# Patient Record
Sex: MALE | Race: WHITE | NOT HISPANIC OR LATINO | Employment: OTHER | URBAN - METROPOLITAN AREA
[De-identification: names, ages, dates, MRNs, and addresses within clinical notes are randomized per-mention and may not be internally consistent; named-entity substitution may affect disease eponyms.]

---

## 2018-03-02 ENCOUNTER — OFFICE VISIT (OUTPATIENT)
Dept: URGENT CARE | Facility: CLINIC | Age: 83
End: 2018-03-02
Payer: MEDICARE

## 2018-03-02 VITALS
TEMPERATURE: 97.8 F | HEART RATE: 58 BPM | WEIGHT: 223 LBS | HEIGHT: 72 IN | OXYGEN SATURATION: 98 % | DIASTOLIC BLOOD PRESSURE: 76 MMHG | BODY MASS INDEX: 30.2 KG/M2 | SYSTOLIC BLOOD PRESSURE: 124 MMHG

## 2018-03-02 DIAGNOSIS — R21 RASH: ICD-10-CM

## 2018-03-02 DIAGNOSIS — H61.21 IMPACTED CERUMEN OF RIGHT EAR: ICD-10-CM

## 2018-03-02 PROCEDURE — 99203 OFFICE O/P NEW LOW 30 MIN: CPT | Performed by: PHYSICIAN ASSISTANT

## 2018-03-02 RX ORDER — DESONIDE 0.5 MG/G
OINTMENT TOPICAL
Qty: 1 TUBE | Refills: 0 | Status: SHIPPED | OUTPATIENT
Start: 2018-03-02 | End: 2019-05-29

## 2018-03-02 RX ORDER — DESONIDE 0.5 MG/G
OINTMENT TOPICAL 2 TIMES DAILY
COMMUNITY
End: 2018-03-02 | Stop reason: SDUPTHER

## 2018-03-02 RX ORDER — CHLORAL HYDRATE 500 MG
1000 CAPSULE ORAL
COMMUNITY

## 2018-03-02 ASSESSMENT — ENCOUNTER SYMPTOMS: PALPITATIONS: 0

## 2018-03-02 NOTE — PROGRESS NOTES
Subjective:      Justo Webber is a 82 y.o. male who presents with Cerumen Impaction (Earwax removal)            Cerumen Impaction   This is a new problem. The current episode started in the past 7 days. The problem occurs constantly. The problem has been gradually improving. Associated symptoms include a rash. Pertinent negatives include no chest pain. Nothing aggravates the symptoms. Treatments tried: ear wax remover. The treatment provided moderate relief.       Review of Systems   HENT: Positive for hearing loss.    Cardiovascular: Negative for chest pain and palpitations.   Skin: Positive for rash.   All other systems reviewed and are negative.    PMH:  has no past medical history on file.  MEDS:   Current Outpatient Prescriptions:   •  Multiple Vitamin (MULTI-DAY VITAMINS PO), Take  by mouth., Disp: , Rfl:   •  aspirin (ASPIRIN LOW DOSE) 81 MG tablet, Take 81 mg by mouth every day., Disp: , Rfl:   •  Omega-3 Fatty Acids (FISH OIL) 1000 MG Cap capsule, Take 1,000 mg by mouth 3 times a day, with meals., Disp: , Rfl:   •  desonide (DESOWEN) 0.05 % ointment, Apply  to affected area(s) 2 times a day., Disp: 1 Tube, Rfl: 0  ALLERGIES:   Allergies   Allergen Reactions   • Other Food Hives and Itching     Artificial Sweetner     SURGHX: History reviewed. No pertinent surgical history.  SOCHX:    FH: Family history was reviewed, no pertinent findings to report  Medications, Allergies, and current problem list reviewed today in Epic       Objective:     /76   Pulse (!) 58   Temp 36.6 °C (97.8 °F)   Ht 1.829 m (6')   Wt 101.2 kg (223 lb)   SpO2 98%   BMI 30.24 kg/m²      Physical Exam   Constitutional: He appears well-developed and well-nourished.   HENT:   Cerumen in right ear canal   Cardiovascular: Normal rate, regular rhythm and normal heart sounds.    Pulmonary/Chest: Effort normal and breath sounds normal.   Skin: Skin is warm and dry. Rash noted. There is erythema.        Psychiatric: He has a  normal mood and affect. His behavior is normal. Judgment and thought content normal.   Vitals reviewed.              Assessment/Plan:     1. Rash  - Rash consistent with tinea cruris  - desonide (DESOWEN) 0.05 % ointment; Apply  to affected area(s) 2 times a day.  Dispense: 1 Tube; Refill: 0    2. Impacted cerumen of right ear  - CONT debrox as needed    Differential diagnosis, natural history, supportive care discussed. Follow-up with primary care provider within 7-10 days, emergency room precautions discussed.  Patient and/or family appears understanding of information.

## 2019-05-29 ENCOUNTER — OFFICE VISIT (OUTPATIENT)
Dept: URGENT CARE | Facility: CLINIC | Age: 84
End: 2019-05-29
Payer: MEDICARE

## 2019-05-29 VITALS
TEMPERATURE: 99.4 F | HEIGHT: 72 IN | OXYGEN SATURATION: 90 % | RESPIRATION RATE: 12 BRPM | DIASTOLIC BLOOD PRESSURE: 82 MMHG | HEART RATE: 66 BPM | WEIGHT: 235.6 LBS | BODY MASS INDEX: 31.91 KG/M2 | SYSTOLIC BLOOD PRESSURE: 158 MMHG

## 2019-05-29 DIAGNOSIS — Z76.0 MEDICATION REFILL: ICD-10-CM

## 2019-05-29 DIAGNOSIS — R21 RASH: ICD-10-CM

## 2019-05-29 PROCEDURE — 99214 OFFICE O/P EST MOD 30 MIN: CPT | Performed by: NURSE PRACTITIONER

## 2019-05-29 RX ORDER — DESONIDE 0.5 MG/G
OINTMENT TOPICAL
Qty: 1 TUBE | Refills: 0 | Status: SHIPPED | OUTPATIENT
Start: 2019-05-29 | End: 2022-07-11

## 2019-05-29 RX ORDER — DESONIDE 0.5 MG/G
OINTMENT TOPICAL
Qty: 1 TUBE | Refills: 0 | Status: SHIPPED | OUTPATIENT
Start: 2019-05-29 | End: 2019-05-29

## 2019-05-29 ASSESSMENT — ENCOUNTER SYMPTOMS
COUGH: 0
SHORTNESS OF BREATH: 0
CHILLS: 0
SORE THROAT: 0
TINGLING: 0
NAUSEA: 0
FEVER: 0

## 2019-05-29 ASSESSMENT — LIFESTYLE VARIABLES: SUBSTANCE_ABUSE: 0

## 2019-05-29 NOTE — PROGRESS NOTES
Subjective:      Justo Webber is a 83 y.o. male who presents with Other (REFILL ON OINTMENT )    Reviewed past medical, surgical and family history with patient. Reviewed prescription and OTC medications with patient in electronic health record today.     Allergies   Allergen Reactions   • Other Food Hives and Itching     Artificial Sweetner             HPI  This is a new problem.  Justo is a 83-year-old male patient requesting refill on medication for groin itch.  He has been on medication on and off for several years.  He applies it once every 3 to 4 days which he will alleviates his itch and provide some comfort.  He has no visible rash.  He tries to keep himself clean and dry.  Wears cotton underclothing.  No other aggravating or alleviating factors.  Requests a written prescription so he consented to his mail pharmacy.      Review of Systems   Constitutional: Negative for chills, fever and malaise/fatigue.   HENT: Negative for sore throat.    Respiratory: Negative for cough and shortness of breath.    Gastrointestinal: Negative for nausea.   Skin: Positive for itching (groin). Negative for rash.   Neurological: Negative for tingling.   Psychiatric/Behavioral: Negative for substance abuse.          Objective:     /82 (BP Location: Left arm, Patient Position: Sitting, BP Cuff Size: Adult)   Pulse 66   Temp 37.4 °C (99.4 °F) (Temporal)   Resp 12   Ht 1.829 m (6')   Wt 106.9 kg (235 lb 9.6 oz)   SpO2 90%   BMI 31.95 kg/m²      Physical Exam   Constitutional: He is oriented to person, place, and time. He appears well-developed and well-nourished.   HENT:   Head: Normocephalic.   Cardiovascular: Normal rate.    Pulmonary/Chest: Effort normal.   Neurological: He is alert and oriented to person, place, and time.   Skin: Skin is warm. Capillary refill takes less than 2 seconds.        Psychiatric: He has a normal mood and affect. His speech is normal and behavior is normal. Thought content normal.    Nursing note and vitals reviewed.              Assessment/Plan:     1. Rash  desonide (DESOWEN) 0.05 % ointment    DISCONTINUED: desonide (DESOWEN) 0.05 % ointment   2. Medication refill         Educated in proper administration of medication(s) ordered today including safety, possible SE, risks, benefits, rationale and alternatives to therapy.     FU prn .  Recommend that he follow-up with primary care provider regarding prolonged itchiness in his groin.  Patient verbalizes understanding.    Keep skin clean and dry.  Wash daily with mild soap and water.

## 2021-01-14 DIAGNOSIS — Z23 NEED FOR VACCINATION: ICD-10-CM

## 2022-07-06 ENCOUNTER — HOSPITAL ENCOUNTER (EMERGENCY)
Facility: MEDICAL CENTER | Age: 87
End: 2022-07-06
Attending: EMERGENCY MEDICINE
Payer: MEDICARE

## 2022-07-06 ENCOUNTER — APPOINTMENT (OUTPATIENT)
Dept: RADIOLOGY | Facility: MEDICAL CENTER | Age: 87
End: 2022-07-06
Attending: EMERGENCY MEDICINE
Payer: MEDICARE

## 2022-07-06 VITALS
HEART RATE: 59 BPM | BODY MASS INDEX: 28.85 KG/M2 | RESPIRATION RATE: 16 BRPM | HEIGHT: 72 IN | OXYGEN SATURATION: 97 % | SYSTOLIC BLOOD PRESSURE: 150 MMHG | TEMPERATURE: 97.3 F | DIASTOLIC BLOOD PRESSURE: 69 MMHG | WEIGHT: 212.96 LBS

## 2022-07-06 DIAGNOSIS — R42 VERTIGO: ICD-10-CM

## 2022-07-06 LAB
ALBUMIN SERPL BCP-MCNC: 4.4 G/DL (ref 3.2–4.9)
ALBUMIN/GLOB SERPL: 1.4 G/DL
ALP SERPL-CCNC: 60 U/L (ref 30–99)
ALT SERPL-CCNC: 11 U/L (ref 2–50)
ANION GAP SERPL CALC-SCNC: 13 MMOL/L (ref 7–16)
AST SERPL-CCNC: 15 U/L (ref 12–45)
BASOPHILS # BLD AUTO: 1 % (ref 0–1.8)
BASOPHILS # BLD: 0.07 K/UL (ref 0–0.12)
BILIRUB SERPL-MCNC: 0.7 MG/DL (ref 0.1–1.5)
BUN SERPL-MCNC: 19 MG/DL (ref 8–22)
CALCIUM SERPL-MCNC: 9.3 MG/DL (ref 8.5–10.5)
CHLORIDE SERPL-SCNC: 109 MMOL/L (ref 96–112)
CO2 SERPL-SCNC: 21 MMOL/L (ref 20–33)
CREAT SERPL-MCNC: 1.26 MG/DL (ref 0.5–1.4)
EKG IMPRESSION: NORMAL
EOSINOPHIL # BLD AUTO: 0.07 K/UL (ref 0–0.51)
EOSINOPHIL NFR BLD: 1 % (ref 0–6.9)
ERYTHROCYTE [DISTWIDTH] IN BLOOD BY AUTOMATED COUNT: 48.5 FL (ref 35.9–50)
GFR SERPLBLD CREATININE-BSD FMLA CKD-EPI: 56 ML/MIN/1.73 M 2
GLOBULIN SER CALC-MCNC: 3.1 G/DL (ref 1.9–3.5)
GLUCOSE SERPL-MCNC: 133 MG/DL (ref 65–99)
HCT VFR BLD AUTO: 41.5 % (ref 42–52)
HGB BLD-MCNC: 14 G/DL (ref 14–18)
IMM GRANULOCYTES # BLD AUTO: 0.02 K/UL (ref 0–0.11)
IMM GRANULOCYTES NFR BLD AUTO: 0.3 % (ref 0–0.9)
LYMPHOCYTES # BLD AUTO: 0.93 K/UL (ref 1–4.8)
LYMPHOCYTES NFR BLD: 13.7 % (ref 22–41)
MCH RBC QN AUTO: 32 PG (ref 27–33)
MCHC RBC AUTO-ENTMCNC: 33.7 G/DL (ref 33.7–35.3)
MCV RBC AUTO: 94.7 FL (ref 81.4–97.8)
MONOCYTES # BLD AUTO: 0.31 K/UL (ref 0–0.85)
MONOCYTES NFR BLD AUTO: 4.6 % (ref 0–13.4)
NEUTROPHILS # BLD AUTO: 5.37 K/UL (ref 1.82–7.42)
NEUTROPHILS NFR BLD: 79.4 % (ref 44–72)
NRBC # BLD AUTO: 0 K/UL
NRBC BLD-RTO: 0 /100 WBC
PLATELET # BLD AUTO: 188 K/UL (ref 164–446)
PMV BLD AUTO: 11.1 FL (ref 9–12.9)
POTASSIUM SERPL-SCNC: 5 MMOL/L (ref 3.6–5.5)
PROT SERPL-MCNC: 7.5 G/DL (ref 6–8.2)
RBC # BLD AUTO: 4.38 M/UL (ref 4.7–6.1)
SODIUM SERPL-SCNC: 143 MMOL/L (ref 135–145)
TROPONIN T SERPL-MCNC: 12 NG/L (ref 6–19)
WBC # BLD AUTO: 6.8 K/UL (ref 4.8–10.8)

## 2022-07-06 PROCEDURE — 36415 COLL VENOUS BLD VENIPUNCTURE: CPT

## 2022-07-06 PROCEDURE — 93005 ELECTROCARDIOGRAM TRACING: CPT

## 2022-07-06 PROCEDURE — 84484 ASSAY OF TROPONIN QUANT: CPT

## 2022-07-06 PROCEDURE — 70496 CT ANGIOGRAPHY HEAD: CPT | Mod: MG

## 2022-07-06 PROCEDURE — 700117 HCHG RX CONTRAST REV CODE 255: Performed by: EMERGENCY MEDICINE

## 2022-07-06 PROCEDURE — 99284 EMERGENCY DEPT VISIT MOD MDM: CPT

## 2022-07-06 PROCEDURE — 70498 CT ANGIOGRAPHY NECK: CPT | Mod: MG

## 2022-07-06 PROCEDURE — 700105 HCHG RX REV CODE 258: Performed by: EMERGENCY MEDICINE

## 2022-07-06 PROCEDURE — 700102 HCHG RX REV CODE 250 W/ 637 OVERRIDE(OP): Performed by: EMERGENCY MEDICINE

## 2022-07-06 PROCEDURE — 85025 COMPLETE CBC W/AUTO DIFF WBC: CPT

## 2022-07-06 PROCEDURE — 71045 X-RAY EXAM CHEST 1 VIEW: CPT

## 2022-07-06 PROCEDURE — 93005 ELECTROCARDIOGRAM TRACING: CPT | Performed by: EMERGENCY MEDICINE

## 2022-07-06 PROCEDURE — A9270 NON-COVERED ITEM OR SERVICE: HCPCS | Performed by: EMERGENCY MEDICINE

## 2022-07-06 PROCEDURE — 80053 COMPREHEN METABOLIC PANEL: CPT

## 2022-07-06 RX ORDER — MECLIZINE HYDROCHLORIDE 25 MG/1
25 TABLET ORAL ONCE
Status: COMPLETED | OUTPATIENT
Start: 2022-07-06 | End: 2022-07-06

## 2022-07-06 RX ORDER — SODIUM CHLORIDE 9 MG/ML
500 INJECTION, SOLUTION INTRAVENOUS ONCE
Status: COMPLETED | OUTPATIENT
Start: 2022-07-06 | End: 2022-07-06

## 2022-07-06 RX ORDER — MECLIZINE HYDROCHLORIDE 25 MG/1
25 TABLET ORAL 3 TIMES DAILY PRN
Qty: 30 TABLET | Refills: 0 | Status: SHIPPED | OUTPATIENT
Start: 2022-07-06 | End: 2022-07-11

## 2022-07-06 RX ADMIN — IOHEXOL 75 ML: 350 INJECTION, SOLUTION INTRAVENOUS at 15:23

## 2022-07-06 RX ADMIN — SODIUM CHLORIDE 500 ML: 9 INJECTION, SOLUTION INTRAVENOUS at 14:45

## 2022-07-06 RX ADMIN — MECLIZINE HYDROCHLORIDE 25 MG: 25 TABLET ORAL at 14:45

## 2022-07-06 NOTE — ED PROVIDER NOTES
ED Provider Note    CHIEF COMPLAINT  Chief Complaint   Patient presents with   • Dizziness     Patient reports sudden onset of dizziness this morning at approx 0815   • Abdominal Pain     Patient reports sudden onset abdominal pain this morning. Pain has now resolved   • Chest Pressure     Patient reports chest pressure this morning which has now resolved.       HPI  Justo Webber is a 86 y.o. male who presents for evaluation of acute dizziness with difficulty walking.  The patient woke up this morning in his normal state of health, states he slept in 30 to 60 minutes longer than usual.  Shortly after waking up while standing at the counter he had acute onset of dizziness described as a disequilibrium, he was having trouble walking because of this.  At the same time he had a very mild pressure in his chest and a feeling of nausea.  He states that very mild chest pressure has resolved and the nausea has resolved, he states that his dizziness has improved.  He has never had an episode like this in the past.  He describes an episode 6 months ago where he became lightheaded but different than this episode.  No shortness of breath, no coughing, no fever.  No focal weakness numbness or tingling.  No headache.  No neck pain.  No other acute complaints offered at this time.  Patient describes himself as being in general good health, he rides his bicycle 6 to 7 miles every other day.    REVIEW OF SYSTEMS  Negative for fever, rash, dyspnea, abdominal pain, vomiting, diarrhea, headache, focal weakness, focal numbness, focal tingling, back pain. All other systems are negative.     PAST MEDICAL HISTORY  Hypertension    FAMILY HISTORY  History reviewed. No pertinent family history.    SOCIAL HISTORY  Social History     Tobacco Use   • Smoking status: Never Smoker   • Smokeless tobacco: Never Used   Substance Use Topics   • Alcohol use: Yes   • Drug use: Never       SURGICAL HISTORY  History reviewed. No pertinent surgical  history.    CURRENT MEDICATIONS  I personally reviewed the medication list in the charting documentation.     ALLERGIES  Allergies   Allergen Reactions   • Other Food Hives and Itching     Artificial Sweetner       MEDICAL RECORD  I have reviewed patient's medical record and pertinent results are listed above.      PHYSICAL EXAM  VITAL SIGNS: BP (!) 151/73   Pulse (!) 51   Temp 36.6 °C (97.8 °F) (Temporal)   Resp 16   Ht 1.829 m (6')   Wt 96.6 kg (212 lb 15.4 oz)   SpO2 97%   BMI 28.88 kg/m²    Constitutional: Elderly but well appearing patient in no acute distress.  Awake and alert, not toxic nor ill in appearance.  HENT: Normocephalic, no obvious evidence of acute trauma.  Eyes: No scleral icterus. Normal conjunctiva   Neck: Comfortable movement without any obvious restriction in the range of motion.  Cardiovascular: Upon ascultation I appreciate a regular heart rhythm and a minimally bradycardic rate.   Thorax & Lungs: Normal nonlabored respirations.  Upon application of the stethoscope for auscultation I find there to be no associated chest wall tenderness.  I appreciate no wheezing, rhonchi or rales. There is normal air movement.    Abdomen: The abdomen is not visibly distended. Upon palpation, I find it to be without tenderness.  No mass appreciated.  Skin: The exposed portions of skin reveal no obvious rash or other abnormalities.  Extremities/Musculoskeletal: No obvious sign of acute trauma. No asymmetric calf tenderness or edema.   Neurologic: Awake, alert, oriented.  Cranial nerve examination reveals a right-sided facial droop (wife reports chronic from prior Bell's palsy) normal and symmetric upper and lower extremity motor and sensory function bilaterally.  Normal finger-to-nose.  Becomes unsteady with standing, cannot complete gait testing because of unsteadiness.  Psychiatric: Normal affect appropriate for the clinical situation.    DIAGNOSTIC STUDIES / PROCEDURES    LABS/EKGs  Results for  orders placed or performed during the hospital encounter of 07/06/22   CBC with Differential   Result Value Ref Range    WBC 6.8 4.8 - 10.8 K/uL    RBC 4.38 (L) 4.70 - 6.10 M/uL    Hemoglobin 14.0 14.0 - 18.0 g/dL    Hematocrit 41.5 (L) 42.0 - 52.0 %    MCV 94.7 81.4 - 97.8 fL    MCH 32.0 27.0 - 33.0 pg    MCHC 33.7 33.7 - 35.3 g/dL    RDW 48.5 35.9 - 50.0 fL    Platelet Count 188 164 - 446 K/uL    MPV 11.1 9.0 - 12.9 fL    Neutrophils-Polys 79.40 (H) 44.00 - 72.00 %    Lymphocytes 13.70 (L) 22.00 - 41.00 %    Monocytes 4.60 0.00 - 13.40 %    Eosinophils 1.00 0.00 - 6.90 %    Basophils 1.00 0.00 - 1.80 %    Immature Granulocytes 0.30 0.00 - 0.90 %    Nucleated RBC 0.00 /100 WBC    Neutrophils (Absolute) 5.37 1.82 - 7.42 K/uL    Lymphs (Absolute) 0.93 (L) 1.00 - 4.80 K/uL    Monos (Absolute) 0.31 0.00 - 0.85 K/uL    Eos (Absolute) 0.07 0.00 - 0.51 K/uL    Baso (Absolute) 0.07 0.00 - 0.12 K/uL    Immature Granulocytes (abs) 0.02 0.00 - 0.11 K/uL    NRBC (Absolute) 0.00 K/uL   Complete Metabolic Panel (CMP)   Result Value Ref Range    Sodium 143 135 - 145 mmol/L    Potassium 5.0 3.6 - 5.5 mmol/L    Chloride 109 96 - 112 mmol/L    Co2 21 20 - 33 mmol/L    Anion Gap 13.0 7.0 - 16.0    Glucose 133 (H) 65 - 99 mg/dL    Bun 19 8 - 22 mg/dL    Creatinine 1.26 0.50 - 1.40 mg/dL    Calcium 9.3 8.5 - 10.5 mg/dL    AST(SGOT) 15 12 - 45 U/L    ALT(SGPT) 11 2 - 50 U/L    Alkaline Phosphatase 60 30 - 99 U/L    Total Bilirubin 0.7 0.1 - 1.5 mg/dL    Albumin 4.4 3.2 - 4.9 g/dL    Total Protein 7.5 6.0 - 8.2 g/dL    Globulin 3.1 1.9 - 3.5 g/dL    A-G Ratio 1.4 g/dL   Troponin   Result Value Ref Range    Troponin T 12 6 - 19 ng/L   ESTIMATED GFR   Result Value Ref Range    GFR (CKD-EPI) 56 (A) >60 mL/min/1.73 m 2   EKG   Result Value Ref Range    Report       [facility] Emergency Dept[facility]    Test Date:  2022-07-06  Pt Name:    MARCI SEYMOUR                   Department: ER  MRN:        [id number]                       Room:  Gender:     Male                         Technician: [id number]  :        [id number]                   Requested By:ER TRIAGE PROTOCOL  Order #:    [id number]                    Reading MD: KIM REYNOSO MD    Measurements  Intervals                                Axis  Rate:       52                           P:  MD:                                      QRS:        -17  QRSD:       112                          T:          38  QT:         464  QTc:        432    Interpretive Statements  12 Lead EKG interpreted by me to show: -- Rate 52 -- Rhythm: Normal sinus  rhythm  -- Axis: Normal -- MD and QRS Intervals: Normal -- T waves: No acute changes  --  ST segments: No acute changes -- Ectopy: None. My impression of this EKG:  Does  not indicate acute ischemia at this time.  Electronically Signed On 2022 13:12 :12 PDT by KIM REYNOSO MD          RADIOLOGY  CT-CTA HEAD WITH & W/O-POST PROCESS   Final Result      CT angiogram of the [place] within normal limits for age.      CT-CTA NECK WITH & W/O-POST PROCESSING   Final Result      1.  Moderate 50-70% proximal right internal carotid artery stenosis.   2.  Significant proximal left ICA atherosclerosis with less than 50% stenosis.   3.  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      DX-CHEST-PORTABLE (1 VIEW)   Final Result      No acute cardiopulmonary abnormality.            COURSE & MEDICAL DECISION MAKING  I have reviewed any medical record information, laboratory studies and radiographic results as noted above.  Differential diagnoses includes: BPPV, central vertigo, ICH, dehydration, electro abnormalities, anemia, ACS    Encounter Summary: This is a very pleasant 86 y.o. male who unfortunately required evaluation in the emergency department today with dizziness described as disequilibrium, associated difficulty walking that is reproduced here in the emergency department.  No other focal neurologic complaints or findings  on exam.  Cute onset this morning.  No history of.  Describes a chest pressure that was mild and resolved now.  Also had some nausea that also resolved.  He has an EKG here which reveals a slightly bradycardic rhythm but no evidence of ischemia.  Will obtain CT and CTA of his head and neck, blood work including a troponin.  Will administer IV fluids and meclizine and he will be reevaluated ------ blood work is actually quite reassuring, has a mild decrease in his GFR, normal creatinine and BUN.  Troponin is normal.  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  After a single dose of the Antivert the patient is feeling improved, he is now walking with a steady gait independently.  At this point, I have gone over very strict return instructions with the patient and his daughter and they expressed understanding.  He will be prescribed Antivert.  Discharged home in stable condition      DISPOSITION: Discharged home in stable condition      FINAL IMPRESSION  1. Vertigo           This dictation was created using voice recognition software. The accuracy of the dictation is limited to the abilities of the software. I expect there may be some errors of grammar and possibly content. The nursing notes were reviewed and certain aspects of this information were incorporated into this note.    Electronically signed by: Fritz Vargas M.D., 7/6/2022 1:34 PM

## 2022-07-06 NOTE — ED TRIAGE NOTES
Chief Complaint   Patient presents with   • Dizziness     Patient reports sudden onset of dizziness this morning at approx 0815   • Abdominal Pain     Patient reports sudden onset abdominal pain this morning. Pain has now resolved   • Chest Pressure     Patient reports chest pressure this morning which has now resolved.       87 yo male to triage for above complaint.Patient reports all symptoms have resolved.    EKG complete. Protocol ordered.     Pt is alert and oriented, speaking in full sentences, follows commands and responds appropriately to questions.     Patient placed back in lobby and educated on triage process. Asked to inform RN of any changes.    BP (!) 151/73   Pulse (!) 51   Temp 36.6 °C (97.8 °F) (Temporal)   Resp 16   Ht 1.829 m (6')   Wt 96.6 kg (212 lb 15.4 oz)   SpO2 97%   BMI 28.88 kg/m²

## 2022-07-08 ENCOUNTER — OFFICE VISIT (OUTPATIENT)
Dept: MEDICAL GROUP | Facility: CLINIC | Age: 87
End: 2022-07-08
Payer: MEDICARE

## 2022-07-08 VITALS
RESPIRATION RATE: 16 BRPM | TEMPERATURE: 97 F | WEIGHT: 220 LBS | SYSTOLIC BLOOD PRESSURE: 133 MMHG | BODY MASS INDEX: 29.84 KG/M2 | OXYGEN SATURATION: 95 % | HEART RATE: 73 BPM | DIASTOLIC BLOOD PRESSURE: 74 MMHG

## 2022-07-08 DIAGNOSIS — R55 NEAR SYNCOPE: ICD-10-CM

## 2022-07-08 PROCEDURE — 99213 OFFICE O/P EST LOW 20 MIN: CPT | Mod: GC

## 2022-07-08 ASSESSMENT — FIBROSIS 4 INDEX: FIB4 SCORE: 2.04

## 2022-07-08 ASSESSMENT — PATIENT HEALTH QUESTIONNAIRE - PHQ9: CLINICAL INTERPRETATION OF PHQ2 SCORE: 0

## 2022-07-08 NOTE — PATIENT INSTRUCTIONS
Please schedule an appointment with cardiology for further evaluation of your near syncope. Please eat food every few hours to avoid low sugar levels. And please follow up with us in a month or return for worsening symptoms.     If cardiology does not follow up please call our referral department at [phone]

## 2022-07-08 NOTE — PROGRESS NOTES
"Subjective:     CC: ER follow up     HPI:   Justo presents today after being evaluated the ER two days ago. Pt reports 2 days ago he woke up and was doing his morning routine when he began to fell \"off\". He describes feeling off balance and not feeling \"steady on his feet\". Denies loss of consciousness, a room spinning sensation, or light headedness. He states he then ate breakfast but his symptoms did not improve so he went to lay down. After he laid down, he describes he started feeling \"light chest pressure\" and nausea that lasted for several hours. He was concerned that he was having a heart attack and went to the ER who discharged him that same day after negative findings for heart attack and normal labs and imaging.   Pt states he is feeling much improved and his symptoms have completely resolved at this time. He reports a similar episode of \"feeling off\" several months ago after he went on a bike ride in the morning without eating. He did have breakfast afterwards and felt much improved. Has not had an episode of chest pressure in the past.     I reviewed his ER notes, EKG indicated Rate 52 Rhythm: Normal sinus  Rhythm. Axis: Normal. OH and QRS Intervals: Normal T waves: No acute changes. ST segments: No acute changes. Ectopy: None. CT angiogram indicated moderate artery stenosis of the right internal carotid at 50-70% and proximal left ICA atherosclerosis at 50%. Chest X-ray no acute finding. CMP and troponin were within normal limits.    Current Outpatient Medications Ordered in Epic   Medication Sig Dispense Refill   • meclizine (ANTIVERT) 25 MG Tab Take 1 Tablet by mouth 3 times a day as needed for Dizziness. 30 Tablet 0   • lisinopril (PRINIVIL) 20 MG Tab TAKE 1 TABLET EVERY DAY 90 Tablet 3   • desonide (DESOWEN) 0.05 % ointment Apply to rash BID prn itch 1 Tube 0   • Multiple Vitamin (MULTI-DAY VITAMINS PO) Take  by mouth.     • aspirin (ASPIRIN LOW DOSE) 81 MG tablet Take 81 mg by mouth every day.     • " Omega-3 Fatty Acids (FISH OIL) 1000 MG Cap capsule Take 1,000 mg by mouth 3 times a day, with meals.       No current Epic-ordered facility-administered medications on file.     ROS:  Gen: no fevers/chills, no changes in weight  Pulm: no sob, no cough  CV: no chest pain, no palpitations  GI: no nausea/vomiting, no diarrhea    Objective:     Exam:  /74   Pulse 73   Temp 36.1 °C (97 °F) (Temporal)   Resp 16   Wt 99.8 kg (220 lb)   SpO2 95%   BMI 29.84 kg/m²  Body mass index is 29.84 kg/m².    Gen: Alert and oriented, No apparent distress.  Neck: Neck is supple without lymphadenopathy.  Lungs: Normal effort, CTA bilaterally, no wheezes, rhonchi, or rales  CV: 3/6 systolic murmur left sternal border, Regular rate and rhythm. No thrill, rubs, or gallops.  Ext: No clubbing, cyanosis, edema.    Labs: no new labs performed today    Assessment & Plan:     86 y.o. male with the following -     1. Near syncope  Patient's syncope could be due to cardiac abnormalities, less likely hypoglycemia, orthostatic hypotension   -Recent labs were normal   -His symptoms did not improve with eating   -Pt does have a 3/6 systolic murmur at baseline   -Symptom occurred with chest pressure for several hours.   -I ordered a referral for cardiology for further evaluation.     Return in about 1 month (around 8/8/2022) for follow up regarding your near syncope and chest discomfort as well as after cardiology appointment .    Bernice Larson M.D.  PGY1

## 2022-07-11 ENCOUNTER — APPOINTMENT (OUTPATIENT)
Dept: RADIOLOGY | Facility: MEDICAL CENTER | Age: 87
End: 2022-07-11
Attending: STUDENT IN AN ORGANIZED HEALTH CARE EDUCATION/TRAINING PROGRAM
Payer: MEDICARE

## 2022-07-11 ENCOUNTER — HOSPITAL ENCOUNTER (OUTPATIENT)
Facility: MEDICAL CENTER | Age: 87
LOS: 1 days | End: 2022-07-12
Attending: EMERGENCY MEDICINE | Admitting: FAMILY MEDICINE
Payer: MEDICARE

## 2022-07-11 DIAGNOSIS — R42 DYSEQUILIBRIUM: ICD-10-CM

## 2022-07-11 DIAGNOSIS — R42 VERTIGO: ICD-10-CM

## 2022-07-11 DIAGNOSIS — R55 NEAR SYNCOPE: ICD-10-CM

## 2022-07-11 DIAGNOSIS — I45.10 RIGHT BUNDLE BRANCH BLOCK (RBBB): ICD-10-CM

## 2022-07-11 PROBLEM — R20.2 PARESTHESIA OF BILATERAL LEGS: Status: ACTIVE | Noted: 2022-07-11

## 2022-07-11 LAB
ALBUMIN SERPL BCP-MCNC: 4.4 G/DL (ref 3.2–4.9)
ALBUMIN/GLOB SERPL: 1.2 G/DL
ALP SERPL-CCNC: 64 U/L (ref 30–99)
ALT SERPL-CCNC: 11 U/L (ref 2–50)
ANION GAP SERPL CALC-SCNC: 11 MMOL/L (ref 7–16)
AST SERPL-CCNC: 16 U/L (ref 12–45)
BASOPHILS # BLD AUTO: 1.4 % (ref 0–1.8)
BASOPHILS # BLD: 0.1 K/UL (ref 0–0.12)
BILIRUB SERPL-MCNC: 0.6 MG/DL (ref 0.1–1.5)
BUN SERPL-MCNC: 18 MG/DL (ref 8–22)
CALCIUM SERPL-MCNC: 9.6 MG/DL (ref 8.5–10.5)
CHLORIDE SERPL-SCNC: 101 MMOL/L (ref 96–112)
CO2 SERPL-SCNC: 25 MMOL/L (ref 20–33)
CREAT SERPL-MCNC: 1.38 MG/DL (ref 0.5–1.4)
EKG IMPRESSION: NORMAL
EOSINOPHIL # BLD AUTO: 0.26 K/UL (ref 0–0.51)
EOSINOPHIL NFR BLD: 3.7 % (ref 0–6.9)
ERYTHROCYTE [DISTWIDTH] IN BLOOD BY AUTOMATED COUNT: 47.8 FL (ref 35.9–50)
GFR SERPLBLD CREATININE-BSD FMLA CKD-EPI: 50 ML/MIN/1.73 M 2
GLOBULIN SER CALC-MCNC: 3.6 G/DL (ref 1.9–3.5)
GLUCOSE SERPL-MCNC: 128 MG/DL (ref 65–99)
HCT VFR BLD AUTO: 43.6 % (ref 42–52)
HGB BLD-MCNC: 14.9 G/DL (ref 14–18)
IMM GRANULOCYTES # BLD AUTO: 0.01 K/UL (ref 0–0.11)
IMM GRANULOCYTES NFR BLD AUTO: 0.1 % (ref 0–0.9)
LYMPHOCYTES # BLD AUTO: 2.1 K/UL (ref 1–4.8)
LYMPHOCYTES NFR BLD: 29.6 % (ref 22–41)
MCH RBC QN AUTO: 31.9 PG (ref 27–33)
MCHC RBC AUTO-ENTMCNC: 34.2 G/DL (ref 33.7–35.3)
MCV RBC AUTO: 93.4 FL (ref 81.4–97.8)
MONOCYTES # BLD AUTO: 0.39 K/UL (ref 0–0.85)
MONOCYTES NFR BLD AUTO: 5.5 % (ref 0–13.4)
NEUTROPHILS # BLD AUTO: 4.24 K/UL (ref 1.82–7.42)
NEUTROPHILS NFR BLD: 59.7 % (ref 44–72)
NRBC # BLD AUTO: 0 K/UL
NRBC BLD-RTO: 0 /100 WBC
PLATELET # BLD AUTO: 214 K/UL (ref 164–446)
PMV BLD AUTO: 10.9 FL (ref 9–12.9)
POTASSIUM SERPL-SCNC: 4.1 MMOL/L (ref 3.6–5.5)
PROT SERPL-MCNC: 8 G/DL (ref 6–8.2)
RBC # BLD AUTO: 4.67 M/UL (ref 4.7–6.1)
SODIUM SERPL-SCNC: 137 MMOL/L (ref 135–145)
TROPONIN T SERPL-MCNC: 16 NG/L (ref 6–19)
WBC # BLD AUTO: 7.1 K/UL (ref 4.8–10.8)

## 2022-07-11 PROCEDURE — 96372 THER/PROPH/DIAG INJ SC/IM: CPT

## 2022-07-11 PROCEDURE — 70551 MRI BRAIN STEM W/O DYE: CPT | Mod: MG

## 2022-07-11 PROCEDURE — 700102 HCHG RX REV CODE 250 W/ 637 OVERRIDE(OP): Performed by: EMERGENCY MEDICINE

## 2022-07-11 PROCEDURE — 700111 HCHG RX REV CODE 636 W/ 250 OVERRIDE (IP): Performed by: STUDENT IN AN ORGANIZED HEALTH CARE EDUCATION/TRAINING PROGRAM

## 2022-07-11 PROCEDURE — 84484 ASSAY OF TROPONIN QUANT: CPT

## 2022-07-11 PROCEDURE — 99285 EMERGENCY DEPT VISIT HI MDM: CPT

## 2022-07-11 PROCEDURE — 93005 ELECTROCARDIOGRAM TRACING: CPT | Performed by: EMERGENCY MEDICINE

## 2022-07-11 PROCEDURE — 80053 COMPREHEN METABOLIC PANEL: CPT

## 2022-07-11 PROCEDURE — A9270 NON-COVERED ITEM OR SERVICE: HCPCS | Performed by: EMERGENCY MEDICINE

## 2022-07-11 PROCEDURE — 85025 COMPLETE CBC W/AUTO DIFF WBC: CPT

## 2022-07-11 PROCEDURE — 700102 HCHG RX REV CODE 250 W/ 637 OVERRIDE(OP): Performed by: STUDENT IN AN ORGANIZED HEALTH CARE EDUCATION/TRAINING PROGRAM

## 2022-07-11 PROCEDURE — 93005 ELECTROCARDIOGRAM TRACING: CPT

## 2022-07-11 PROCEDURE — 36415 COLL VENOUS BLD VENIPUNCTURE: CPT

## 2022-07-11 PROCEDURE — A9270 NON-COVERED ITEM OR SERVICE: HCPCS | Performed by: STUDENT IN AN ORGANIZED HEALTH CARE EDUCATION/TRAINING PROGRAM

## 2022-07-11 PROCEDURE — G0378 HOSPITAL OBSERVATION PER HR: HCPCS

## 2022-07-11 PROCEDURE — 99218 PR INITIAL OBSERVATION CARE,LEVL I: CPT | Mod: GC | Performed by: FAMILY MEDICINE

## 2022-07-11 RX ORDER — AMOXICILLIN 250 MG
2 CAPSULE ORAL 2 TIMES DAILY
Status: DISCONTINUED | OUTPATIENT
Start: 2022-07-11 | End: 2022-07-12 | Stop reason: HOSPADM

## 2022-07-11 RX ORDER — ENOXAPARIN SODIUM 100 MG/ML
40 INJECTION SUBCUTANEOUS DAILY
Status: DISCONTINUED | OUTPATIENT
Start: 2022-07-11 | End: 2022-07-12 | Stop reason: HOSPADM

## 2022-07-11 RX ORDER — ONDANSETRON 4 MG/1
4 TABLET, ORALLY DISINTEGRATING ORAL EVERY 4 HOURS PRN
Status: DISCONTINUED | OUTPATIENT
Start: 2022-07-11 | End: 2022-07-12 | Stop reason: HOSPADM

## 2022-07-11 RX ORDER — ACETAMINOPHEN 325 MG/1
650 TABLET ORAL EVERY 6 HOURS PRN
Status: DISCONTINUED | OUTPATIENT
Start: 2022-07-11 | End: 2022-07-12 | Stop reason: HOSPADM

## 2022-07-11 RX ORDER — POLYETHYLENE GLYCOL 3350 17 G/17G
1 POWDER, FOR SOLUTION ORAL
Status: DISCONTINUED | OUTPATIENT
Start: 2022-07-11 | End: 2022-07-12 | Stop reason: HOSPADM

## 2022-07-11 RX ORDER — HYDRALAZINE HYDROCHLORIDE 20 MG/ML
10 INJECTION INTRAMUSCULAR; INTRAVENOUS EVERY 4 HOURS PRN
Status: DISCONTINUED | OUTPATIENT
Start: 2022-07-11 | End: 2022-07-12 | Stop reason: HOSPADM

## 2022-07-11 RX ORDER — LISINOPRIL 20 MG/1
20 TABLET ORAL DAILY
Status: DISCONTINUED | OUTPATIENT
Start: 2022-07-11 | End: 2022-07-12 | Stop reason: HOSPADM

## 2022-07-11 RX ORDER — OMEPRAZOLE 20 MG/1
20 CAPSULE, DELAYED RELEASE ORAL DAILY
Status: DISCONTINUED | OUTPATIENT
Start: 2022-07-11 | End: 2022-07-12 | Stop reason: HOSPADM

## 2022-07-11 RX ORDER — MECLIZINE HYDROCHLORIDE 25 MG/1
25 TABLET ORAL ONCE
Status: COMPLETED | OUTPATIENT
Start: 2022-07-11 | End: 2022-07-11

## 2022-07-11 RX ORDER — BISACODYL 10 MG
10 SUPPOSITORY, RECTAL RECTAL
Status: DISCONTINUED | OUTPATIENT
Start: 2022-07-11 | End: 2022-07-12 | Stop reason: HOSPADM

## 2022-07-11 RX ORDER — ONDANSETRON 2 MG/ML
4 INJECTION INTRAMUSCULAR; INTRAVENOUS EVERY 4 HOURS PRN
Status: DISCONTINUED | OUTPATIENT
Start: 2022-07-11 | End: 2022-07-12 | Stop reason: HOSPADM

## 2022-07-11 RX ADMIN — ENOXAPARIN SODIUM 40 MG: 40 INJECTION SUBCUTANEOUS at 17:42

## 2022-07-11 RX ADMIN — MECLIZINE HYDROCHLORIDE 25 MG: 25 TABLET ORAL at 14:21

## 2022-07-11 ASSESSMENT — COGNITIVE AND FUNCTIONAL STATUS - GENERAL
DRESSING REGULAR LOWER BODY CLOTHING: A LITTLE
CLIMB 3 TO 5 STEPS WITH RAILING: A LITTLE
SUGGESTED CMS G CODE MODIFIER MOBILITY: CJ
DAILY ACTIVITIY SCORE: 22
WALKING IN HOSPITAL ROOM: A LITTLE
MOVING FROM LYING ON BACK TO SITTING ON SIDE OF FLAT BED: A LITTLE
HELP NEEDED FOR BATHING: A LITTLE
SUGGESTED CMS G CODE MODIFIER DAILY ACTIVITY: CJ
MOBILITY SCORE: 20
STANDING UP FROM CHAIR USING ARMS: A LITTLE

## 2022-07-11 ASSESSMENT — PAIN DESCRIPTION - PAIN TYPE: TYPE: ACUTE PAIN;CHRONIC PAIN

## 2022-07-11 ASSESSMENT — FIBROSIS 4 INDEX: FIB4 SCORE: 2.04

## 2022-07-11 NOTE — ED NOTES
Pt to room via wheelchair.  Pt able to ambulate to bed from  with standby assist.  Pt is alert and oriented with a GCS of 15.  Pt states he is not dizzy and does not have double vision but rather has a problem with his equilibrium.  Pt resting comfortably.  Placed on monitor.

## 2022-07-11 NOTE — ED NOTES
Ok to give patient food per ERP.  Patient given half of a turkey sandwich and a mac and cheese.  Patient super cute and thankful   Yes

## 2022-07-11 NOTE — H&P
[facility] MEDICINE HISTORY AND PHYSICAL     PATIENT ID:  NAME:  Justo Webber  MRN:               [id number]  YOB: 1935    Date of Admission:   7/11/2022     Attending:   Dr. Galdamez     Resident:   Haider Metcalf D.O.    Primary Care Physician:    Dr. Willams     CC:    Dizziness       HPI:   Justo Webber is a 86 y.o. male with past medical history of hypertension presented for recurrence of dizziness and bilateral lower extremity paresthesias.  Patient was seen in the emergency department 7/6/2022 for same complaint and was also experiencing chest pain at that time.  He had CTA head and neck imaging that showed right ICA stenosis.  He was discharged home with meclizine, which she says did not provide significant relief of symptoms.  However symptoms did resolve and reoccurred today, which brought him back to the emergency department.  He states that he has not experienced fevers, chills, chest pain, shortness of breath, abdominal pain, nausea, emesis, hematochezia, focal numbness or weakness.  Denies back pain, bowel or bladder incontinence.  He has experienced bilateral lower extremity numbness with associated weakness, which has made ambulation difficult.    ERCourse:  In emergency department, patient bradycardic otherwise he medically stable.  C CBC and CMP essentially unremarkable.  Troponin normal.  ECG negative for ST elevations, or heart block.    REVIEW OF SYSTEMS:   See HPI.    PAST MEDICAL HISTORY:  History reviewed. No pertinent past medical history.    PAST SURGICAL HISTORY:  History reviewed. No pertinent surgical history.    FAMILY HISTORY:  No family history on file.    SOCIAL HISTORY:   Lives in home with wife.  Denies tobacco or illicit drug use.  Infrequent alcohol use.    DIET:   Orders Placed This Encounter   Procedures   • Diet Order Diet: Cardiac     Standing Status:   Standing     Number of Occurrences:   1     Order Specific Question:   Diet:     Answer:   Cardiac [6]        ALLERGIES:  Allergies   Allergen Reactions   • Other Food Hives and Itching     Artificial Sweetner       OUTPATIENT MEDICATIONS:    Current Facility-Administered Medications:   •  senna-docusate (PERICOLACE or SENOKOT S) 8.6-50 MG per tablet 2 Tablet, 2 Tablet, Oral, BID **AND** polyethylene glycol/lytes (MIRALAX) PACKET 1 Packet, 1 Packet, Oral, QDAY PRN **AND** magnesium hydroxide (MILK OF MAGNESIA) suspension 30 mL, 30 mL, Oral, QDAY PRN **AND** bisacodyl (DULCOLAX) suppository 10 mg, 10 mg, Rectal, QDAY PRN, CHRISTEN SotoOJosue  •  enoxaparin (Lovenox) inj 40 mg, 40 mg, Subcutaneous, DAILY AT 1800, NOEMÍ Soto.OJosue  •  acetaminophen (Tylenol) tablet 650 mg, 650 mg, Oral, Q6HRS PRN, CHRISTEN SotoO.  •  hydrALAZINE (APRESOLINE) injection 10 mg, 10 mg, Intravenous, Q4HRS PRN, CHRISTEN SotoO.  •  ondansetron (ZOFRAN) syringe/vial injection 4 mg, 4 mg, Intravenous, Q4HRS PRN, CHRISTEN SotoO.  •  ondansetron (ZOFRAN ODT) dispertab 4 mg, 4 mg, Oral, Q4HRS PRN, NOEMÍ Soto.OJosue  •  lisinopril (PRINIVIL) tablet 20 mg, 20 mg, Oral, QDAY, NOEMÍ Soto.OJosue    Current Outpatient Medications:   •  meclizine (ANTIVERT) 25 MG Tab, Take 1 Tablet by mouth 3 times a day as needed for Dizziness. (Patient not taking: Reported on 7/8/2022), Disp: 30 Tablet, Rfl: 0  •  lisinopril (PRINIVIL) 20 MG Tab, TAKE 1 TABLET EVERY DAY, Disp: 90 Tablet, Rfl: 3  •  desonide (DESOWEN) 0.05 % ointment, Apply to rash BID prn itch (Patient not taking: Reported on 7/8/2022), Disp: 1 Tube, Rfl: 0  •  Multiple Vitamin (MULTI-DAY VITAMINS PO), Take  by mouth., Disp: , Rfl:   •  aspirin 81 MG tablet, Take 81 mg by mouth every day. (Patient not taking: Reported on 7/8/2022), Disp: , Rfl:   •  Omega-3 Fatty Acids (FISH OIL) 1000 MG Cap capsule, Take 1,000 mg by mouth 3 times a day, with meals., Disp: , Rfl:     PHYSICAL EXAM:  Vitals:    07/11/22 0900 07/11/22 1028 07/11/22 1234 07/11/22  1306   BP: 132/69  135/77 (!) 144/68   Pulse: 80  (!) 51 (!) 50   Resp: 18  18 18   Temp: 36.7 °C (98 °F)   36.7 °C (98 °F)   TempSrc: Temporal      SpO2: 95%  97% 99%   Weight:  99.5 kg (219 lb 5.7 oz)     Height: 1.829 m (6')      , Temp (24hrs), Av.7 °C (98 °F), Min:36.7 °C (98 °F), Max:36.7 °C (98 °F)  , Pulse Oximetry: 99 %, O2 Delivery Device: None - Room Air    General: Pt resting in NAD, cooperative. Deaf.   Skin:  Pink, warm and dry.  No rashes  HEENT: NC/AT. EOMI. MMM. No nasal discharge. Oropharynx nonerythematous without exudate/plaques  Neck:  Supple without lymphadenopathy.    Lungs:  Symmetrical.  CTAB with no W/R/R.  Good air movement   Cardiovascular:  Bradycardia   Abdomen:  Abdomen is soft NT/ND.  Extremities:  No lower extremity edema.   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 No focal deficits.     LAB TESTS:   Recent Labs     22  1536   WBC 7.1   RBC 4.67*   HEMOGLOBIN 14.9   HEMATOCRIT 43.6   MCV 93.4   MCH 31.9   RDW 47.8   PLATELETCT 214   MPV 10.9   NEUTSPOLYS 59.70   LYMPHOCYTES 29.60   MONOCYTES 5.50   EOSINOPHILS 3.70   BASOPHILS 1.40         Recent Labs     22  1536   SODIUM 137   POTASSIUM 4.1   CHLORIDE 101   CO2 25   BUN 18   CREATININE 1.38   CALCIUM 9.6   ALBUMIN 4.4       CULTURES:   Results     ** No results found for the last 168 hours. **          IMAGES:  EC-ECHOCARDIOGRAM COMPLETE W/O CONT    (Results Pending)         CONSULTS:   None      ASSESSMENT/PLAN:   86 y.o. male admitted for     #Disequilibrium  Patient has been experiencing symptoms of just equilibrium, which may be contributory to his bilateral lower extremity paresthesias which she describes as episodic.  -Disequilibrium is described in relation to lower extremity paresthesias, with no focal deficits. No DM (last A1c 5.3 3/30/22), no macrocytosis to suggest B12/folate deficiency and no chronic ETOH.  -CTA head and neck 2022  showed moderate 50-70% stenosis of right ICA, less then 50% left ICA stenosis and left vertebral artery stenosis. Unclear if vascular disease is contributory given symptoms are transient without focal deficits to suggest TIA, and additionally no visual changes were reported. Will evaluate as described below with MRI. If symptoms reoccur can consider vascular surgery consultation.   -ECG and troponins reassuring  -Echocardiogram ordered  -Brain MRI ordered  -Telemetry monitoring    #Carotid Stenosis   -Does not appear to be symptomatic as there has been no evidence of TIA or visual changes   -Will need close out patient follow up   -If symptoms develop low threshold for vascular surgery consultation     #Bradycardia  -Patient has been experiencing bradycardia, which has been ultimately asymptomatic  -Unclear if this is a new problem   -Continue monitor on telemetry  -No significant heart block seen on ECG  -If bradycardia worsens, will consider cardiology consultation  -Avoid heart rate reducing medications    Chronic medical problems:    #Hypertension  -Continue lisinopril 20 mg daily      Core Measures:  Lines: PIV  Tubes: None  Diet: Cardiac  Abx: None  DVT Ppx: Lovenox  GI Ppx: Omeprazole  PCP: Dr. Willams  CODE STATUS: DNR/DNI    Dispo: Inpatient for evaluation of disequilibrium with associated paresthesias concerning for neurologic or cardiac etiology        Haider Metcalf D.O.  Family Medicine Resident PGY-3

## 2022-07-11 NOTE — ED PROVIDER NOTES
ED Provider Note    CHIEF COMPLAINT  Chief Complaint   Patient presents with   • Vertigo       HPI  Justo Webber is a 86 y.o. male who returns with balance problems.  He was evaluated here 5 days ago by myself, at that time was experiencing similar albeit worse symptoms of being off balance and difficulty walking.  At that time he also had some discomfort in his chest and some nausea.  He had a negative head CT, had blood work which was unrevealing.  He was treated with meclizine and his symptoms had resolved.  He remained asymptomatic for about 48 hours and then had a return of his symptoms.  Over the past few days he has had difficulty walking because of being off balance.  Has been having to hold onto things in order not to fall.  No difficulty with breathing or discomfort in his chest, no abdominal pain, no nausea or vomiting, no back pain.  The only thing that is new is tingling in both of his lower extremities which although improving is still present.  No other acute complaints.    REVIEW OF SYSTEMS  Negative for fever, rash, chest pain, dyspnea, abdominal pain, nausea, vomiting, diarrhea, headache, focal weakness, focal numbness, back pain. All other systems are negative.     PAST MEDICAL HISTORY  History reviewed. No pertinent past medical history.    FAMILY HISTORY  No family history on file.    SOCIAL HISTORY  Social History     Tobacco Use   • Smoking status: Never Smoker   • Smokeless tobacco: Never Used   Substance Use Topics   • Alcohol use: Yes   • Drug use: Never       SURGICAL HISTORY  History reviewed. No pertinent surgical history.    CURRENT MEDICATIONS  I personally reviewed the medication list in the charting documentation.     ALLERGIES  Allergies   Allergen Reactions   • Other Food Hives and Itching     Artificial Sweetner       MEDICAL RECORD  I have reviewed patient's medical record and pertinent results are listed above.      PHYSICAL EXAM  VITAL SIGNS: BP (!) 144/68   Pulse (!) 50    Temp 36.7 °C (98 °F)   Resp 18   Ht 1.829 m (6')   Wt 99.5 kg (219 lb 5.7 oz)   SpO2 99%   BMI 29.75 kg/m²    Constitutional: Well appearing patient in no acute distress.  Awake and alert, not toxic nor ill in appearance.  HENT: Normocephalic, no obvious evidence of acute trauma.  Eyes: No scleral icterus. Normal conjunctiva   Neck: Comfortable movement without any obvious restriction in the range of motion.  Cardiovascular: Upon ascultation I appreciate a regular heart rhythm and a normal rate.   Thorax & Lungs: Normal nonlabored respirations.  Upon application of the stethoscope for auscultation I find there to be no associated chest wall tenderness.  I appreciate no wheezing, rhonchi or rales. There is normal air movement.    Abdomen: The abdomen is not visibly distended. Upon palpation, I find it to be without tenderness.  No mass appreciated.  Skin: The exposed portions of skin reveal no obvious rash or other abnormalities.  Extremities/Musculoskeletal: No obvious sign of acute trauma. No asymmetric calf tenderness or edema.   Neurologic: Alert & oriented. No focal deficits observed.   Psychiatric: Normal affect appropriate for the clinical situation.    DIAGNOSTIC STUDIES / PROCEDURES    LABS/EKGs  Results for orders placed or performed during the hospital encounter of 07/11/22   CBC WITH DIFFERENTIAL   Result Value Ref Range    WBC 7.1 4.8 - 10.8 K/uL    RBC 4.67 (L) 4.70 - 6.10 M/uL    Hemoglobin 14.9 14.0 - 18.0 g/dL    Hematocrit 43.6 42.0 - 52.0 %    MCV 93.4 81.4 - 97.8 fL    MCH 31.9 27.0 - 33.0 pg    MCHC 34.2 33.7 - 35.3 g/dL    RDW 47.8 35.9 - 50.0 fL    Platelet Count 214 164 - 446 K/uL    MPV 10.9 9.0 - 12.9 fL    Neutrophils-Polys 59.70 44.00 - 72.00 %    Lymphocytes 29.60 22.00 - 41.00 %    Monocytes 5.50 0.00 - 13.40 %    Eosinophils 3.70 0.00 - 6.90 %    Basophils 1.40 0.00 - 1.80 %    Immature Granulocytes 0.10 0.00 - 0.90 %    Nucleated RBC 0.00 /100 WBC    Neutrophils (Absolute) 4.24  1.82 - 7.42 K/uL    Lymphs (Absolute) 2.10 1.00 - 4.80 K/uL    Monos (Absolute) 0.39 0.00 - 0.85 K/uL    Eos (Absolute) 0.26 0.00 - 0.51 K/uL    Baso (Absolute) 0.10 0.00 - 0.12 K/uL    Immature Granulocytes (abs) 0.01 0.00 - 0.11 K/uL    NRBC (Absolute) 0.00 K/uL   COMP METABOLIC PANEL   Result Value Ref Range    Sodium 137 135 - 145 mmol/L    Potassium 4.1 3.6 - 5.5 mmol/L    Chloride 101 96 - 112 mmol/L    Co2 25 20 - 33 mmol/L    Anion Gap 11.0 7.0 - 16.0    Glucose 128 (H) 65 - 99 mg/dL    Bun 18 8 - 22 mg/dL    Creatinine 1.38 0.50 - 1.40 mg/dL    Calcium 9.6 8.5 - 10.5 mg/dL    AST(SGOT) 16 12 - 45 U/L    ALT(SGPT) 11 2 - 50 U/L    Alkaline Phosphatase 64 30 - 99 U/L    Total Bilirubin 0.6 0.1 - 1.5 mg/dL    Albumin 4.4 3.2 - 4.9 g/dL    Total Protein 8.0 6.0 - 8.2 g/dL    Globulin 3.6 (H) 1.9 - 3.5 g/dL    A-G Ratio 1.2 g/dL   TROPONIN   Result Value Ref Range    Troponin T 16 6 - 19 ng/L   ESTIMATED GFR   Result Value Ref Range    GFR (CKD-EPI) 50 (A) >60 mL/min/1.73 m 2   EKG   Result Value Ref Range    Report       [facility]    Test Date:  2022  Pt Name:    MARCI SEYMOUR                   Department: ER  MRN:        [id number]                      Room:       ORTHO  Gender:     Male                         Technician: [id number]  :        1935                   Requested By:ER TRIAGE PROTOCOL  Order #:    [id number]                    Reading MD: KIM REYNOSO MD    Measurements  Intervals                                Axis  Rate:       70                           P:          6  LA:         78                           QRS:        1  QRSD:       169                          T:          -4  QT:         441  QTc:        474    Interpretive Statements  12 Lead EKG interpreted by me to show: -- Rate 70 -- Rhythm: Normal sinus  rhythm  -- Axis: Normal -- LA and QRS Intervals: RBBB -- T waves: No acute changes --  ST  segments: No acute changes -- Ectopy: None. My  impression of this EKG: Does  not  indicate acute ischemia at this time. New RIGHT BUNDLE BRAN CH BLOCK compared  to  prior  Electronically Signed On 7- 13:48:13 PDT by FRITZ VARGAS MD          COURSE & MEDICAL DECISION MAKING  I have reviewed any medical record information, laboratory studies and radiographic results as noted above.  Differential diagnoses includes: Dehydration, electrolyte abnormalities, anemia, CVA, BPPV    Encounter Summary: This is a very pleasant 86 y.o. male who unfortunately required evaluation in the emergency department today with a return of his disequilibrium, had a work-up 5 days ago including head CT with CTA of the head and neck and blood work, was feeling better at discharge, symptoms returned a couple days later.  No focal neurologic complaints.  Appears generally well on exam.  At this point he is unable to walk normally, independently, he will require hospitalization for further evaluation and care.  Specifically I am concerned of the possibility of a central etiology of vertigo.  Distantly, his EKG is somewhat changed from prior, he now has a right bundle branch block which was not there, his troponin 5 days ago was normal, remained so today.      DISPOSITION: Admit in guarded condition      FINAL IMPRESSION  1. Vertigo    2. Right bundle branch block (RBBB)           This dictation was created using voice recognition software. The accuracy of the dictation is limited to the abilities of the software. I expect there may be some errors of grammar and possibly content. The nursing notes were reviewed and certain aspects of this information were incorporated into this note.    Electronically signed by: Fritz Vargas M.D., 7/11/2022 1:51 PM

## 2022-07-11 NOTE — ED NOTES
Patient medicated par MAR.  Patient requested and given a pillow.  Patient also asking for something to eat.  Will check with ERP

## 2022-07-11 NOTE — ED TRIAGE NOTES
"Vitals:    07/11/22 0900   BP: 132/69   Pulse: 80   Resp: 18   Temp: 36.7 °C (98 °F)   SpO2: 95%     Chief Complaint   Patient presents with   • Vertigo     Pt was seen here last Wednesday for the same complaint. He was given meclizine but this unfortunately hasn't helped. He is still having balance issues and describes it as \"my equilibrium is off.\" Pt requiring hand held assist to ambulate safely. He was told to return if his symptoms did not improve.    Pt is alert and oriented x 4, accompanied by his wife, wheeled to and from triage.   "

## 2022-07-12 ENCOUNTER — APPOINTMENT (OUTPATIENT)
Dept: CARDIOLOGY | Facility: MEDICAL CENTER | Age: 87
End: 2022-07-12
Attending: STUDENT IN AN ORGANIZED HEALTH CARE EDUCATION/TRAINING PROGRAM
Payer: MEDICARE

## 2022-07-12 VITALS
BODY MASS INDEX: 29.02 KG/M2 | OXYGEN SATURATION: 93 % | TEMPERATURE: 97.6 F | RESPIRATION RATE: 18 BRPM | DIASTOLIC BLOOD PRESSURE: 69 MMHG | HEART RATE: 61 BPM | HEIGHT: 72 IN | SYSTOLIC BLOOD PRESSURE: 115 MMHG | WEIGHT: 214.29 LBS

## 2022-07-12 LAB
ANION GAP SERPL CALC-SCNC: 10 MMOL/L (ref 7–16)
BUN SERPL-MCNC: 20 MG/DL (ref 8–22)
CALCIUM SERPL-MCNC: 9.3 MG/DL (ref 8.5–10.5)
CHLORIDE SERPL-SCNC: 106 MMOL/L (ref 96–112)
CO2 SERPL-SCNC: 26 MMOL/L (ref 20–33)
CREAT SERPL-MCNC: 1.46 MG/DL (ref 0.5–1.4)
ERYTHROCYTE [DISTWIDTH] IN BLOOD BY AUTOMATED COUNT: 47.8 FL (ref 35.9–50)
GFR SERPLBLD CREATININE-BSD FMLA CKD-EPI: 47 ML/MIN/1.73 M 2
GLUCOSE SERPL-MCNC: 96 MG/DL (ref 65–99)
HCT VFR BLD AUTO: 42 % (ref 42–52)
HGB BLD-MCNC: 14.2 G/DL (ref 14–18)
LV EJECT FRACT  99904: 55
LV EJECT FRACT MOD 4C 99902: 54.11
MCH RBC QN AUTO: 31.8 PG (ref 27–33)
MCHC RBC AUTO-ENTMCNC: 33.8 G/DL (ref 33.7–35.3)
MCV RBC AUTO: 94.2 FL (ref 81.4–97.8)
PLATELET # BLD AUTO: 207 K/UL (ref 164–446)
PMV BLD AUTO: 10.9 FL (ref 9–12.9)
POTASSIUM SERPL-SCNC: 4.7 MMOL/L (ref 3.6–5.5)
RBC # BLD AUTO: 4.46 M/UL (ref 4.7–6.1)
SODIUM SERPL-SCNC: 142 MMOL/L (ref 135–145)
WBC # BLD AUTO: 7.4 K/UL (ref 4.8–10.8)

## 2022-07-12 PROCEDURE — 700102 HCHG RX REV CODE 250 W/ 637 OVERRIDE(OP): Performed by: STUDENT IN AN ORGANIZED HEALTH CARE EDUCATION/TRAINING PROGRAM

## 2022-07-12 PROCEDURE — 85027 COMPLETE CBC AUTOMATED: CPT

## 2022-07-12 PROCEDURE — 97535 SELF CARE MNGMENT TRAINING: CPT

## 2022-07-12 PROCEDURE — 36415 COLL VENOUS BLD VENIPUNCTURE: CPT

## 2022-07-12 PROCEDURE — 93306 TTE W/DOPPLER COMPLETE: CPT | Mod: 26 | Performed by: INTERNAL MEDICINE

## 2022-07-12 PROCEDURE — 93306 TTE W/DOPPLER COMPLETE: CPT

## 2022-07-12 PROCEDURE — 700117 HCHG RX CONTRAST REV CODE 255: Performed by: STUDENT IN AN ORGANIZED HEALTH CARE EDUCATION/TRAINING PROGRAM

## 2022-07-12 PROCEDURE — 97162 PT EVAL MOD COMPLEX 30 MIN: CPT

## 2022-07-12 PROCEDURE — G0378 HOSPITAL OBSERVATION PER HR: HCPCS

## 2022-07-12 PROCEDURE — A9270 NON-COVERED ITEM OR SERVICE: HCPCS | Performed by: STUDENT IN AN ORGANIZED HEALTH CARE EDUCATION/TRAINING PROGRAM

## 2022-07-12 PROCEDURE — 80048 BASIC METABOLIC PNL TOTAL CA: CPT

## 2022-07-12 PROCEDURE — 96374 THER/PROPH/DIAG INJ IV PUSH: CPT | Mod: XU

## 2022-07-12 PROCEDURE — 99217 PR OBSERVATION CARE DISCHARGE: CPT | Mod: GC | Performed by: FAMILY MEDICINE

## 2022-07-12 RX ADMIN — HUMAN ALBUMIN MICROSPHERES AND PERFLUTREN 3 ML: 10; .22 INJECTION, SOLUTION INTRAVENOUS at 15:07

## 2022-07-12 RX ADMIN — LISINOPRIL 20 MG: 20 TABLET ORAL at 08:46

## 2022-07-12 RX ADMIN — OMEPRAZOLE 20 MG: 20 CAPSULE, DELAYED RELEASE ORAL at 06:13

## 2022-07-12 ASSESSMENT — PATIENT HEALTH QUESTIONNAIRE - PHQ9
2. FEELING DOWN, DEPRESSED, IRRITABLE, OR HOPELESS: NOT AT ALL
1. LITTLE INTEREST OR PLEASURE IN DOING THINGS: NOT AT ALL
SUM OF ALL RESPONSES TO PHQ9 QUESTIONS 1 AND 2: 0

## 2022-07-12 ASSESSMENT — COGNITIVE AND FUNCTIONAL STATUS - GENERAL
CLIMB 3 TO 5 STEPS WITH RAILING: A LITTLE
MOBILITY SCORE: 20
SUGGESTED CMS G CODE MODIFIER MOBILITY: CJ
MOVING FROM LYING ON BACK TO SITTING ON SIDE OF FLAT BED: A LITTLE
STANDING UP FROM CHAIR USING ARMS: A LITTLE
WALKING IN HOSPITAL ROOM: A LITTLE

## 2022-07-12 ASSESSMENT — GAIT ASSESSMENTS
DISTANCE (FEET): 200
GAIT LEVEL OF ASSIST: MINIMAL ASSIST
ASSISTIVE DEVICE: HAND HELD ASSIST

## 2022-07-12 ASSESSMENT — LIFESTYLE VARIABLES
TOTAL SCORE: 0
ALCOHOL_USE: NO
EVER HAD A DRINK FIRST THING IN THE MORNING TO STEADY YOUR NERVES TO GET RID OF A HANGOVER: NO
EVER FELT BAD OR GUILTY ABOUT YOUR DRINKING: NO
HOW MANY TIMES IN THE PAST YEAR HAVE YOU HAD 5 OR MORE DRINKS IN A DAY: 0
ON A TYPICAL DAY WHEN YOU DRINK ALCOHOL HOW MANY DRINKS DO YOU HAVE: 0
AVERAGE NUMBER OF DAYS PER WEEK YOU HAVE A DRINK CONTAINING ALCOHOL: 0
TOTAL SCORE: 0
HAVE YOU EVER FELT YOU SHOULD CUT DOWN ON YOUR DRINKING: NO
CONSUMPTION TOTAL: NEGATIVE
HAVE PEOPLE ANNOYED YOU BY CRITICIZING YOUR DRINKING: NO
TOTAL SCORE: 0

## 2022-07-12 ASSESSMENT — FIBROSIS 4 INDEX: FIB4 SCORE: 1.98

## 2022-07-12 NOTE — THERAPY
"Physical Therapy   Initial Evaluation     Patient Name: Justo Webber  Age:  86 y.o., Sex:  male  Medical Record #: [id number]  Today's Date: 7/12/2022     Precautions  Precautions: Fall Risk    Assessment  Patient is 86 y.o. male that presented to acute with complaints of disequilibrium. He presented to PT with impaired balance but reported no prior falls and demonstrated understanding of education regarding fall risk, risks associated with falls including mortality, balance impairment, use of AD, and strategies for modifying activities to prevent falls. He mobilized as detailed below. Patient appears to have vestibular issue affecting balance and would likely benefit from outpatient vestibular PT or ENT consult following DC to address higher level deficits. He reported no concerns regarding mobility or returning home following medical DC and was agreeable to use of FWW for ambulation following education. Discussed patient with OT; patient and spouse refused need for acute OT services. Patient will not be actively followed for physical therapy services at this time, however may be seen if requested by physician for 1 more visit within 30 days to address any discharge or equipment needs.      Plan    Recommend Physical Therapy for Evaluation only    DC Equipment Recommendations: Front-Wheel Walker  Discharge Recommendations:  (recommend outpatient vestibular PT or ENT consult)       Subjective    \"I'll use it if I have to.\" (Referring to FWW)     Objective       07/12/22 1525   Charge Group   PT Evaluation PT Evaluation Mod  (23 min)   Initial Contact Note    Initial Contact Note Order Received and Verified, Evaluation Only - Patient Does Not Require Further Acute Physical Therapy at this Time.  However, May Benefit from Post Acute Therapy for Higher Level Functional Deficits.   Precautions   Precautions Fall Risk   Vitals   O2 (LPM) 0   O2 Delivery Device None - Room Air   Pain 0 - 10 Group   Therapist Pain " Assessment   (no pain complaint during session)   Prior Living Situation   Prior Services None   Housing / Facility 1 Story House   Steps Into Home 1   Steps In Home 0   Bathroom Set up Bathtub / Shower Combination;Walk In Shower   Equipment Owned None   Lives with - Patient's Self Care Capacity Spouse   Comments Spouse at bedside and supportive; able to assist with IADLs but not mobility   Prior Level of Functional Mobility   Bed Mobility Independent   Transfer Status Independent   Ambulation Independent   Distance Ambulation (Feet)   (community)   Assistive Devices Used None   Stairs Independent   Comments Patient reported he most recently worked as an    History of Falls   History of Falls No   Cognition    Cognition / Consciousness WDL   Level of Consciousness Alert   Comments very pleasant, cooperative, receptive to education   Passive ROM Lower Body   Passive ROM Lower Body WDL   Comments not formally tested, WFL for mobility   Active ROM Lower Body    Active ROM Lower Body  WDL   Comments as above   Strength Lower Body   Lower Body Strength  WDL   Comments as above   Sensation Lower Body   Lower Extremity Sensation   WDL   Comments per patient report   Lower Body Muscle Tone   Lower Body Muscle Tone  WDL   Coordination Lower Body    Coordination Lower Body  WDL   Balance Assessment   Sitting Balance (Static) Good   Sitting Balance (Dynamic) Good   Standing Balance (Static) Fair -   Standing Balance (Dynamic) Fair -   Weight Shift Sitting Good   Weight Shift Standing Good   Comments initially no AD and patient reaching for wall/furniture for external support, then with HHA, recommend use of FWW   Gait Analysis   Gait Level Of Assist Minimal Assist   Assistive Device Hand Held Assist   Distance (Feet) 200   # of Times Distance was Traveled 1   Deviation   (inconsistent MARCELO and step length)   # of Stairs Climbed 0   Weight Bearing Status no restrictions   Vision Deficits Impacting Mobility NT   Comments  increased LOB during ambulation with challenges to gait including head turns, environmental distractions   Bed Mobility    Supine to Sit Supervised  (HOB flat, no rail, increased time and effort)   Sit to Supine   (NT, left in chair)   Scooting Supervised  (seated)   Functional Mobility   Sit to Stand Contact Guard Assist   Bed, Chair, Wheelchair Transfer Contact Guard Assist   Toilet Transfers Contact Guard Assist  (stood at toilet)   Transfer Method Stand Step   How much difficulty does the patient currently have...   Turning over in bed (including adjusting bedclothes, sheets and blankets)? 4   Sitting down on and standing up from a chair with arms (e.g., wheelchair, bedside commode, etc.) 3   Moving from lying on back to sitting on the side of the bed? 4   How much help from another person does the patient currently need...   Moving to and from a bed to a chair (including a wheelchair)? 3   Need to walk in a hospital room? 3   Climbing 3-5 steps with a railing? 3   6 clicks Mobility Score 20   Activity Tolerance   Sitting in Chair left in chair   Sitting Edge of Bed 8 min   Standing 4-6 min   Comments no overt pain, fatigue, SOB, dizziness but balance impaired and activity limited by therapist due to safety   Edema / Skin Assessment   Edema / Skin  Not Assessed   Education Group   Education Provided Role of Physical Therapist   Role of Physical Therapist Patient Response Patient;Significant Other;Acceptance;Explanation;Verbal Demonstration   Additional Comments extensive education regarding balance, safe ambulation, risk of falls, use of AD, strategies for modifying ADLs to reduce fall risk; patient and spouse verbalized understanding   Problem List    Problems Impaired Balance   Anticipated Discharge Equipment and Recommendations   DC Equipment Recommendations Front-Wheel Walker   Discharge Recommendations   (recommend outpatient vestibular PT or ENT consult)   Interdisciplinary Plan of Care Collaboration   IDT  Collaboration with  Nursing;Family / Caregiver   Patient Position at End of Therapy Seated;Call Light within Reach;Family / Friend in Room   Collaboration Comments RN aware of visit, response. Spouse at bedside   Session Information   Date / Session Number  7/12 - DC needs only

## 2022-07-12 NOTE — PROGRESS NOTES
1600  Assumed care at 1600.   Pt denies pain  Pt on cardiac diet  Pt on RA.    1845  Report given to night shift.

## 2022-07-12 NOTE — THERAPY
Occupational Therapy Contact Note      Patient Name: Justo Webber  Age:  86 y.o., Sex:  male  Medical Record #: [id number]  Today's Date: 7/12/2022 07/12/22 1529   Interdisciplinary Plan of Care Collaboration   Collaboration Comments Per PT no further therapy needs with plan to DC home. Pt primarily concerned about balance deficits. Defer to PT recommendations. Will sign off.

## 2022-07-12 NOTE — FACE TO FACE
Face to Face Note  -  Durable Medical Equipment    Justo Hazel M.D. - NPI: [id number]  I certify that this patient is under my care and that they had a durable medical equipment(DME)face to face encounter by myself that meets the physician DME face-to-face encounter requirements with this patient on:    Date of encounter:   Patient:                    MRN:                       YOB: 2021  Justo Webber  [id number]  1935     The encounter with the patient was in whole, or in part, for the following medical condition, which is the primary reason for durable medical equipment:  Other - Chronic bradycardia & dysequilibrium     I certify that, based on my findings, the following durable medical equipment is medically necessary:  Walkers.    HOME O2 Saturation Measurements:(Values must be present for Home Oxygen orders)         ,     ,         My Clinical findings support the need for the above equipment due to:  Abnormal Gait, Other - high risk for falls    Supporting Symptoms: dizziness    If patient feels more short of breath, they can go up to 6 liters per minute and contact healthcare provider.

## 2022-07-12 NOTE — PROGRESS NOTES
Patient transported to unit via gurney on zoll monitor. Patient was able to stand and pivot into bed himself, tolerated well with no complaints of numbness or dizziness. He is alert and oriented x4, on room air with no complaints of pain. He has been oriented to surroundings including use of call light to alert staff of any needs, he verbalizes understanding. Plan of care reviewed, answered all questions accordingly. Patient connected to tele box. Bed is locked and in the lowest position, call light within reach.

## 2022-07-12 NOTE — PROGRESS NOTES
4 Eyes Skin Assessment Completed by JUSTIN Guillen and Gabriela RN.    Head WDL  Ears WDL  Nose WDL  Mouth WDL  Neck WDL  Breast/Chest WDL  Shoulder Blades WDL  Spine WDL  (R) Arm/Elbow/Hand WDL  (L) Arm/Elbow/Hand WDL  Abdomen WDL  Groin WDL  Scrotum/Coccyx/Buttocks WDL  (R) Leg WDL  (L) Leg WDL  (R) Heel/Foot/Toe Blanching  (L) Heel/Foot/Toe Blanching          Devices In Places Tele Box      Interventions In Place Pillows    Possible Skin Injury No    Pictures Uploaded Into Epic N/A  Wound Consult Placed N/A  RN Wound Prevention Protocol Ordered No

## 2022-07-12 NOTE — NON-PROVIDER
[facility] [place]GRESS [place]     Attending:   Dr. Christiana Pandya MD    Resident:   Dr. Haider Metcalf,     PATIENT:   Justo Webber; [id number]; 1935    SUBJECTIVE:   No acute over night events.The patient reports he is feeling better toady than when he was admitted. He reports that he was able to walk to the restroom without balance issues. He also reports that his tingling is 95-99% gone.     OBJECTIVE:  Vitals:    07/11/22 2316 07/12/22 0320 07/12/22 0828 07/12/22 0906   BP: 101/55 111/67 115/69    Pulse:  70 61    Resp: 18 16 18    Temp: 36.8 °C (98.2 °F) 36.4 °C (97.6 °F) 36.4 °C (97.6 °F)    TempSrc: Temporal Temporal Temporal    SpO2: 96% 94% 93%    Weight:    97.2 kg (214 lb 4.6 oz)   Height:           Intake/Output Summary (Last 24 hours) at 7/12/2022 1237  Last data filed at 7/12/2022 1000  Gross per 24 hour   Intake --   Output 300 ml   Net -300 ml       PHYSICAL EXAM: Physical exam was limited by the patient laying comfortably in bed.     General: No acute distress, afebrile, resting comfortably, conversational   HEENT: NC/AT. EOMI.   Cardiovascular: RRR, no murmurs appreciated   Respiratory: Normal respiratory effort.   Abdomen: Soft, nontender  EXT:  No edema  Skin: No erythema/lesions   Neuro: Alert and orientated       LABS:  Recent Labs     07/11/22  1536 07/12/22  0454   WBC 7.1 7.4   RBC 4.67* 4.46*   HEMOGLOBIN 14.9 14.2   HEMATOCRIT 43.6 42.0   MCV 93.4 94.2   MCH 31.9 31.8   RDW 47.8 47.8   PLATELETCT 214 207   MPV 10.9 10.9   NEUTSPOLYS 59.70  --    LYMPHOCYTES 29.60  --    MONOCYTES 5.50  --    EOSINOPHILS 3.70  --    BASOPHILS 1.40  --      Recent Labs     07/11/22  1536 07/12/22  0454   SODIUM 137 142   POTASSIUM 4.1 4.7   CHLORIDE 101 106   CO2 25 26   BUN 18 20   CREATININE 1.38 1.46*   CALCIUM 9.6 9.3   ALBUMIN 4.4  --      Estimated GFR/CRCL = Estimated Creatinine Clearance: 44.7 mL/min (A) (by C-G formula based on SCr of 1.46 mg/dL (H)).  Recent Labs     07/11/22  7328  07/12/22  0454   GLUCOSE 128* 96     Recent Labs     07/11/22  1536   ASTSGOT 16   ALTSGPT 11   TBILIRUBIN 0.6   ALKPHOSPHAT 64   GLOBULIN 3.6*             No results for input(s): INR, APTT, FIBRINOGEN in the last 72 hours.    Invalid input(s): DIMER    MICROBIOLOGY:  No results found for: BLOODCULTU, BLDCULT, BCHOLD     IMAGING:  MR-BRAIN-W/O   Final Result      1.  No acute abnormality.   2.  Mild cerebral volume loss.   3.  Mild chronic microvascular ischemic disease.      EC-ECHOCARDIOGRAM COMPLETE W/O CONT    (Results Pending)       CULTURES:   Results     Procedure Component Value Units Date/Time    URINALYSIS [[id number]]     Order Status: No result Specimen: Urine, Clean Catch           MEDS:  Current Facility-Administered Medications   Medication Last Admin   • senna-docusate (PERICOLACE or SENOKOT S) 8.6-50 MG per tablet 2 Tablet      And   • polyethylene glycol/lytes (MIRALAX) PACKET 1 Packet      And   • magnesium hydroxide (MILK OF MAGNESIA) suspension 30 mL      And   • bisacodyl (DULCOLAX) suppository 10 mg     • enoxaparin (Lovenox) inj 40 mg 40 mg at 07/11/22 1742   • acetaminophen (Tylenol) tablet 650 mg     • hydrALAZINE (APRESOLINE) injection 10 mg     • ondansetron (ZOFRAN) syringe/vial injection 4 mg     • ondansetron (ZOFRAN ODT) dispertab 4 mg     • lisinopril (PRINIVIL) tablet 20 mg 20 mg at 07/12/22 0846   • omeprazole (PRILOSEC) capsule 20 mg 20 mg at 07/12/22 0613       ASSESSMENT/PLAN:     Mr. Trujillo is an 85 yo male with a medical history of hypertension and internal carotid artery stenosis admitted on 7/11/2022 for recurrence of disequilibrium and bilateral lower extremity paresthesia.     #Disequilibrium + Bilateral Lower Extremity Paresthesia  Pt. Initially came to the ED on 7/6/2022 with a chief complaint of disequilibrium (which he mistakenly described as vertigo) and was discharged home on meclizine.   His symptoms did not resolve with the meclizine and he came into the ED on  7/11/2022 with a chief complaint of recurrence of disequilibrium and new onset bilateral lower extremity paresthesia.   - Brain MRI reveled volume loss and microvascular changes  - ECHO pending     #Hypertention + Renal Failure  GFR trending down: 56 (6 days ago), 50 (1 day ago), 47 (4:54 am)  Creatinine 1.46   CT head and neck with contrast on 7/6/2022  - Continue Hydralazine injection 10 mg every 4 hrs  - Continue lisinopril 20 mg PO daily   - Continue to monitor GFR   - Continue to monitor creatinine and electrolytes to assess for acute kidney injury      #Carotid Stenosis  CT head and neck on 7/6/2022 revealed 50-70% stenosis of the R. ICA and less than 50% stenosis of the L. ICA   Appears to be asymptomatic  - Continue to monitor for symptoms such as visual changes or signs of a TIA   - Follow up outpatient       Core Measures:   Fluids: None   Lines: IVP  DVT prophylaxis: Lovenox Injection 40mg   Code Status: DNAR/ DNI      Disposition: Pending Echocardiogram and UA      Jorge Luis Urbano, MS3   [facility]

## 2022-07-12 NOTE — CARE PLAN
The patient is Stable - Low risk of patient condition declining or worsening    Shift Goals  Clinical Goals: continue monitoring symtoms, and perform ordered testing; develop safe discharge plan.  Patient Goals: to figure out why he has intermittent balance issues, and get them resolved.    Progress made toward(s) clinical / shift goals:  Justo is able to discharge home with wife today, with recommended follow ups, and with FWW to take home, which was delivered to Justo in his hospital room by [employer]..    Patient is not progressing towards the following goals: N/A        Problem: Knowledge Deficit - Standard  Goal: Patient and family/care givers will demonstrate understanding of plan of care, disease process/condition, diagnostic tests and medications  Outcome: Met   Justo and wife understand follow up recommendations.     Problem: Discharge Barriers/Planning  Goal: Patient's continuum of care needs are met  Outcome: Met   Justo is discharging home with wife today, with FWW in hand.    Problem: Hemodynamics  Goal: Patient's hemodynamics, fluid balance and neurologic status will be stable or improve  Outcome: Met   B/p stable; SR/SB on telemetry with BBB. B/p stable. No peripheral edema. Receiving lisinopril.    Problem: Mobility  Goal: Patient's capacity to carry out activities will improve  Outcome: Met   Justo is able to ambulate, and carry out ADLs.  PT evaluated and made outpatient recommendations, with MD is aware of.    Problem: Self Care  Goal: Patient will have the ability to perform ADLs independently or with assistance (bathe, groom, dress, toilet and feed)  Outcome: Met

## 2022-07-12 NOTE — DISCHARGE SUMMARY
"  [facility] [place] [facility] MEDICINE DISCHARGE SUMMARY     Admit Date:  7/11/2022       Discharge Date:   7/12/2022    Service:   [facility] Family Medicine Inpatient Team  Attending Physician(s):   Dr. Christiana Pandya MD     Senior Resident(s):   Haider Metcalf DO  Johny Resident(s):   Kervin Conrad MD    Primary Diagnosis:   Disequilibrium    Secondary Diagnoses:                Hypertension  Bradycardia  Cardiac Stenosis    HPI:     Per admission H&P dated 7/11/22:    \"Justo Webber is a 86 y.o. male with past medical history of hypertension presented for recurrence of dizziness and bilateral lower extremity paresthesias. Patient was seen in the emergency department 7/6/2022 for same complaint and was also experiencing chest pain at that time. He had CTA head and neck imaging that showed right ICA stenosis. He was discharged home with meclizine, which she says did not provide significant relief of symptoms.  However symptoms did resolve and reoccurred today, which brought him back to the emergency department.  He states that he has not experienced fevers, chills, chest pain, shortness of breath, abdominal pain, nausea, emesis, hematochezia, focal numbness or weakness.  Denies back pain, bowel or bladder incontinence.  He has experienced bilateral lower extremity numbness with associated weakness, which has made ambulation difficult.     ERCourse:  In emergency department, patient bradycardic otherwise he medically stable. CBC and CMP essentially unremarkable. Troponin normal.  ECG negative for ST elevations, or heart block.\"       Hospital Course:       Patient is a 87yo male with a hx of HTN and carotid artery stenosis, initially admitted to the ED with complaint of disequilibrium and bilateral LEs paresthesia. Brain MRI showed normal age-related process without significant acute abnormalities. EKG was reassuring, with no evidence of ST elevation/depresseion that was indicative of acute ischemia. ECHO showed no significant stenosis. " PT/OT recommended front wheel walker and no further therapy needs with plan to DC home.     Justo Webber was determined to be safe for discharge on 7/12/22. No new Medications were prescribed at discharge. Patient was instructed to follow up with ENT as referred, and with PCP after his ENT appointment. Strict precautions were given and opportunity for all questions was provided. Patient verbalized agreement & understanding of plan prior to discharge.       Consultants:      None    Procedures:        None    Imaging/ Testing:      EC-ECHOCARDIOGRAM COMPLETE W/ CONT   Final Result      MR-BRAIN-W/O   Final Result      1.  No acute abnormality.   2.  Mild cerebral volume loss.   3.  Mild chronic microvascular ischemic disease.        Results for orders placed or performed during the hospital encounter of 07/11/22   EC-ECHOCARDIOGRAM COMPLETE W/ CONT   Result Value Ref Range    Eject.Frac. MOD 4C 54.11     Left Ventrical Ejection Fraction 55    CBC WITH DIFFERENTIAL   Result Value Ref Range    WBC 7.1 4.8 - 10.8 K/uL    RBC 4.67 (L) 4.70 - 6.10 M/uL    Hemoglobin 14.9 14.0 - 18.0 g/dL    Hematocrit 43.6 42.0 - 52.0 %    MCV 93.4 81.4 - 97.8 fL    MCH 31.9 27.0 - 33.0 pg    MCHC 34.2 33.7 - 35.3 g/dL    RDW 47.8 35.9 - 50.0 fL    Platelet Count 214 164 - 446 K/uL    MPV 10.9 9.0 - 12.9 fL    Neutrophils-Polys 59.70 44.00 - 72.00 %    Lymphocytes 29.60 22.00 - 41.00 %    Monocytes 5.50 0.00 - 13.40 %    Eosinophils 3.70 0.00 - 6.90 %    Basophils 1.40 0.00 - 1.80 %    Immature Granulocytes 0.10 0.00 - 0.90 %    Nucleated RBC 0.00 /100 WBC    Neutrophils (Absolute) 4.24 1.82 - 7.42 K/uL    Lymphs (Absolute) 2.10 1.00 - 4.80 K/uL    Monos (Absolute) 0.39 0.00 - 0.85 K/uL    Eos (Absolute) 0.26 0.00 - 0.51 K/uL    Baso (Absolute) 0.10 0.00 - 0.12 K/uL    Immature Granulocytes (abs) 0.01 0.00 - 0.11 K/uL    NRBC (Absolute) 0.00 K/uL   COMP METABOLIC PANEL   Result Value Ref Range    Sodium 137 135 - 145 mmol/L     Potassium 4.1 3.6 - 5.5 mmol/L    Chloride 101 96 - 112 mmol/L    Co2 25 20 - 33 mmol/L    Anion Gap 11.0 7.0 - 16.0    Glucose 128 (H) 65 - 99 mg/dL    Bun 18 8 - 22 mg/dL    Creatinine 1.38 0.50 - 1.40 mg/dL    Calcium 9.6 8.5 - 10.5 mg/dL    AST(SGOT) 16 12 - 45 U/L    ALT(SGPT) 11 2 - 50 U/L    Alkaline Phosphatase 64 30 - 99 U/L    Total Bilirubin 0.6 0.1 - 1.5 mg/dL    Albumin 4.4 3.2 - 4.9 g/dL    Total Protein 8.0 6.0 - 8.2 g/dL    Globulin 3.6 (H) 1.9 - 3.5 g/dL    A-G Ratio 1.2 g/dL   TROPONIN   Result Value Ref Range    Troponin T 16 6 - 19 ng/L   ESTIMATED GFR   Result Value Ref Range    GFR (CKD-EPI) 50 (A) >60 mL/min/1.73 m 2   CBC without Differential   Result Value Ref Range    WBC 7.4 4.8 - 10.8 K/uL    RBC 4.46 (L) 4.70 - 6.10 M/uL    Hemoglobin 14.2 14.0 - 18.0 g/dL    Hematocrit 42.0 42.0 - 52.0 %    MCV 94.2 81.4 - 97.8 fL    MCH 31.8 27.0 - 33.0 pg    MCHC 33.8 33.7 - 35.3 g/dL    RDW 47.8 35.9 - 50.0 fL    Platelet Count 207 164 - 446 K/uL    MPV 10.9 9.0 - 12.9 fL   Basic Metabolic Panel (BMP)   Result Value Ref Range    Sodium 142 135 - 145 mmol/L    Potassium 4.7 3.6 - 5.5 mmol/L    Chloride 106 96 - 112 mmol/L    Co2 26 20 - 33 mmol/L    Glucose 96 65 - 99 mg/dL    Bun 20 8 - 22 mg/dL    Creatinine 1.46 (H) 0.50 - 1.40 mg/dL    Calcium 9.3 8.5 - 10.5 mg/dL    Anion Gap 10.0 7.0 - 16.0   ESTIMATED GFR   Result Value Ref Range    GFR (CKD-EPI) 47 (A) >60 mL/min/1.73 m 2   EKG   Result Value Ref Range    Report       [facility]    Test Date:  2022  Pt Name:    MARCI SEYMOUR                   Department: ER  MRN:        [id number]                      Room:       ORTHO  Gender:     Male                         Technician: [id number]  :        1935                   Requested By:ER TRIAGE PROTOCOL  Order #:    [id number]                    Reading MD: KIM REYNOSO MD    Measurements  Intervals                                Axis  Rate:       70                            P:          6  MD:         78                           QRS:        1  QRSD:       169                          T:          -4  QT:         441  QTc:        474    Interpretive Statements  12 Lead EKG interpreted by me to show: -- Rate 70 -- Rhythm: Normal sinus  rhythm  -- Axis: Normal -- MD and QRS Intervals: RBBB -- T waves: No acute changes --  ST  segments: No acute changes -- Ectopy: None. My impression of this EKG: Does  not  indicate acute ischemia at this time. New RIGHT BUNDLE BRAN CH BLOCK compared  to  prior  Electronically Signed On 7- 13:48:13 PDT by KIM REYNOSO MD           Discharge Medications:        Home Medications    Medication Sig Taking? Last Dose Authorizing Provider   VITAMIN D PO Take 1 Tablet by mouth every day. Yes 7/11/2022 at Unknown time Physician Outpatient   lisinopril (PRINIVIL) 20 MG Tab TAKE 1 TABLET EVERY DAY  Patient taking differently: Take 20 mg by mouth every day.  7/11/2022 at Unknown time Christiana Pandya M.D.   Multiple Vitamin (MULTI-DAY VITAMINS PO) Take 1 Tablet by mouth every day.  7/11/2022 at Unknown time Physician Outpatient   Omega-3 Fatty Acids (FISH OIL) 1000 MG Cap capsule Take 1,000 mg by mouth every day.  7/11/2022 at Unknown time Physician Outpatient         Disposition:     Discharged to home    Diet:     Resume home diet    Activity:     As tolerated with walker.     Instructions:      Please see Hospital Summary above for complete list of patient instructions.     Please CC:  Pratik Willams M.D.    Follow up appointment details :      Physician- please assess for disequilibrium.    Pending Studies:        UA (7/12/22)

## 2022-07-12 NOTE — PROGRESS NOTES
[facility] MEDICINE PROGRESS NOTE     Attending: Dr. Christiana Pandya MD    PATIENT: Marci Webber; [id number]; 1935    ID: 86 y.o. male Hx of HTN, chronic bradycardia, carotid artery stenosis who was admitted on 2022 for recurring disequilibrium and B/L LEs paresthesia.    SUBJECTIVE: No acute events overnight. Patient states his tingling sensations feel better. Patient is ambulating without the feeling of disequilibrium. Denies dizziness, headache, impaired vision or ringing in the ears.    OBJECTIVE:   Vitals:    22 2316 22 0320 22 0828 22 0906   BP: 101/55 111/67 115/69    Pulse:  70 61    Resp: 18 16 18    Temp: 36.8 °C (98.2 °F) 36.4 °C (97.6 °F) 36.4 °C (97.6 °F)    TempSrc: Temporal Temporal Temporal    SpO2: 96% 94% 93%    Weight:    97.2 kg (214 lb 4.6 oz)   Height:           Intake/Output Summary (Last 24 hours) at 2022 1533  Last data filed at 2022 1000  Gross per 24 hour   Intake --   Output 300 ml   Net -300 ml       PE:  General: No acute distress, resting comfortably in bed.  HEENT: NC/AT. PERRLA. EOMI. MMM  Cardiovascular: Normal S1/S2, RRR, no m/r/g  Respiratory: Symmetric inspiratory effort. CTAB with no adventitious sounds  Abdomen: BS+, soft, NT/ND   EXT:  CHRISTIAN, 2+ pulses, no rashes, bruising, or bleeding, B/L LE tingling, no numbness   Neuro: No focal neuro deficits      LABS:  Admission on 2022   Component Date Value Ref Range Status   • Report 2022    Final                    Value:[facility] Emergency Dept.    Test Date:  2022  Pt Name:    MARCI WEBBER                   Department: ER  MRN:        [id number]                      Room:       ORTHO  Gender:     Male                         Technician: [id number]  :        1935                   Requested By:ER TRIAGE PROTOCOL  Order #:    [id number]                    Reading MD: KIM REYNOSO MD    Measurements  Intervals                                Axis  Rate:        70                           P:          6  VT:         78                           QRS:        1  QRSD:       169                          T:          -4  QT:         441  QTc:        474    Interpretive Statements  12 Lead EKG interpreted by me to show: -- Rate 70 -- Rhythm: Normal sinus  rhythm  -- Axis: Normal -- VT and QRS Intervals: RBBB -- T waves: No acute changes --  ST  segments: No acute changes -- Ectopy: None. My impression of this EKG: Does  not  indicate acute ischemia at this time. New RIGHT BUNDLE BRAN                          CH BLOCK compared  to  prior  Electronically Signed On 7- 13:48:13 PDT by KIM REYNOSO MD     • WBC 07/11/2022 7.1  4.8 - 10.8 K/uL Final   • RBC 07/11/2022 4.67 (A) 4.70 - 6.10 M/uL Final   • Hemoglobin 07/11/2022 14.9  14.0 - 18.0 g/dL Final   • Hematocrit 07/11/2022 43.6  42.0 - 52.0 % Final   • MCV 07/11/2022 93.4  81.4 - 97.8 fL Final   • MCH 07/11/2022 31.9  27.0 - 33.0 pg Final   • MCHC 07/11/2022 34.2  33.7 - 35.3 g/dL Final   • RDW 07/11/2022 47.8  35.9 - 50.0 fL Final   • Platelet Count 07/11/2022 214  164 - 446 K/uL Final   • MPV 07/11/2022 10.9  9.0 - 12.9 fL Final   • Neutrophils-Polys 07/11/2022 59.70  44.00 - 72.00 % Final   • Lymphocytes 07/11/2022 29.60  22.00 - 41.00 % Final   • Monocytes 07/11/2022 5.50  0.00 - 13.40 % Final   • Eosinophils 07/11/2022 3.70  0.00 - 6.90 % Final   • Basophils 07/11/2022 1.40  0.00 - 1.80 % Final   • Immature Granulocytes 07/11/2022 0.10  0.00 - 0.90 % Final   • Nucleated RBC 07/11/2022 0.00  /100 WBC Final   • Neutrophils (Absolute) 07/11/2022 4.24  1.82 - 7.42 K/uL Final    Includes immature neutrophils, if present.   • Lymphs (Absolute) 07/11/2022 2.10  1.00 - 4.80 K/uL Final   • Monos (Absolute) 07/11/2022 0.39  0.00 - 0.85 K/uL Final   • Eos (Absolute) 07/11/2022 0.26  0.00 - 0.51 K/uL Final   • Baso (Absolute) 07/11/2022 0.10  0.00 - 0.12 K/uL Final   • Immature Granulocytes (abs) 07/11/2022 0.01  0.00 -  0.11 K/uL Final   • NRBC (Absolute) 07/11/2022 0.00  K/uL Final   • Sodium 07/11/2022 137  135 - 145 mmol/L Final   • Potassium 07/11/2022 4.1  3.6 - 5.5 mmol/L Final   • Chloride 07/11/2022 101  96 - 112 mmol/L Final   • Co2 07/11/2022 25  20 - 33 mmol/L Final   • Anion Gap 07/11/2022 11.0  7.0 - 16.0 Final   • Glucose 07/11/2022 128 (A) 65 - 99 mg/dL Final   • Bun 07/11/2022 18  8 - 22 mg/dL Final   • Creatinine 07/11/2022 1.38  0.50 - 1.40 mg/dL Final   • Calcium 07/11/2022 9.6  8.5 - 10.5 mg/dL Final   • AST(SGOT) 07/11/2022 16  12 - 45 U/L Final   • ALT(SGPT) 07/11/2022 11  2 - 50 U/L Final   • Alkaline Phosphatase 07/11/2022 64  30 - 99 U/L Final   • Total Bilirubin 07/11/2022 0.6  0.1 - 1.5 mg/dL Final   • Albumin 07/11/2022 4.4  3.2 - 4.9 g/dL Final   • Total Protein 07/11/2022 8.0  6.0 - 8.2 g/dL Final   • Globulin 07/11/2022 3.6 (A) 1.9 - 3.5 g/dL Final   • A-G Ratio 07/11/2022 1.2  g/dL Final   • Troponin T 07/11/2022 16  6 - 19 ng/L Final    Comment: The Ultra High Sensitivity Troponin T test has a reference range for  positive troponins that follows the recommendation of the American College  of Cardiology (ACC) committee in conjunction with the 99th percentile  reference population. Normal range: 6-19 ng/L     • GFR (CKD-EPI) 07/11/2022 50 (A) >60 mL/min/1.73 m 2 Final    Comment: Effective 3/15/2022, estimated Glomerular Filtration Rate  is calculated using race neutral CKD-EPI 2021 equation  per NKF-ASN recommendations.     • WBC 07/12/2022 7.4  4.8 - 10.8 K/uL Final   • RBC 07/12/2022 4.46 (A) 4.70 - 6.10 M/uL Final   • Hemoglobin 07/12/2022 14.2  14.0 - 18.0 g/dL Final   • Hematocrit 07/12/2022 42.0  42.0 - 52.0 % Final   • MCV 07/12/2022 94.2  81.4 - 97.8 fL Final   • MCH 07/12/2022 31.8  27.0 - 33.0 pg Final   • MCHC 07/12/2022 33.8  33.7 - 35.3 g/dL Final   • RDW 07/12/2022 47.8  35.9 - 50.0 fL Final   • Platelet Count 07/12/2022 207  164 - 446 K/uL Final   • MPV 07/12/2022 10.9  9.0 - 12.9 fL  Final   • Sodium 07/12/2022 142  135 - 145 mmol/L Final   • Potassium 07/12/2022 4.7  3.6 - 5.5 mmol/L Final   • Chloride 07/12/2022 106  96 - 112 mmol/L Final   • Co2 07/12/2022 26  20 - 33 mmol/L Final   • Glucose 07/12/2022 96  65 - 99 mg/dL Final   • Bun 07/12/2022 20  8 - 22 mg/dL Final   • Creatinine 07/12/2022 1.46 (A) 0.50 - 1.40 mg/dL Final   • Calcium 07/12/2022 9.3  8.5 - 10.5 mg/dL Final   • Anion Gap 07/12/2022 10.0  7.0 - 16.0 Final   • GFR (CKD-EPI) 07/12/2022 47 (A) >60 mL/min/1.73 m 2 Final    Comment: Effective 3/15/2022, estimated Glomerular Filtration Rate  is calculated using race neutral CKD-EPI 2021 equation  per NKF-ASN recommendations.          MICROBIOLOGY:  Results     Procedure Component Value Units Date/Time    URINALYSIS [[id number]]     Order Status: No result Specimen: Urine, Clean Catch           IMAGING:  EC-ECHOCARDIOGRAM COMPLETE W/ CONT         MR-BRAIN-W/O   Final Result      1.  No acute abnormality.   2.  Mild cerebral volume loss.   3.  Mild chronic microvascular ischemic disease.          MEDS:  Current Facility-Administered Medications   Medication Last Admin   • senna-docusate (PERICOLACE or SENOKOT S) 8.6-50 MG per tablet 2 Tablet      And   • polyethylene glycol/lytes (MIRALAX) PACKET 1 Packet      And   • magnesium hydroxide (MILK OF MAGNESIA) suspension 30 mL      And   • bisacodyl (DULCOLAX) suppository 10 mg     • enoxaparin (Lovenox) inj 40 mg 40 mg at 07/11/22 1742   • acetaminophen (Tylenol) tablet 650 mg     • hydrALAZINE (APRESOLINE) injection 10 mg     • ondansetron (ZOFRAN) syringe/vial injection 4 mg     • ondansetron (ZOFRAN ODT) dispertab 4 mg     • lisinopril (PRINIVIL) tablet 20 mg 20 mg at 07/12/22 0846   • omeprazole (PRILOSEC) capsule 20 mg 20 mg at 07/12/22 0613       ASSESSMENT/PLAN:  Justo Webber is a 86 y.o. male with Hx of HTN, carotid artery stenosis admitted for recurrence of disequilibrium and B/L LE paresthesias.    #Disequilibrium  Symptoms  better this morning. ECG and troponins reassuring  - ECHO result pending  - Brain MRI result pending  - Telemetry monitoring  - PT/OT evaluation     #Carotid Stenosis (50-70%)  Asymptomatic   - Consider vascular consult if symptoms develop      #Bradycardia  EKG showed no acute ischemia. New RBBB compared to prior  - Continue monitor on telemetry     #Hypertension  -Continue lisinopril 20 mg daily      Core Measures:  Lines: PIV  Abx: Levonox  Diet: Cardiac  DVT PPX: Levonox  GI PPX: Omeprazole  DISPO: Inpatient for disequilibrium; pending results of MRI, Echo and UA. PT/OT evaluation       CODE STATUS: DNAR/DNI    Kervin Conrad MD  PGY1  [employer] [facility] Medicine

## 2022-08-05 ENCOUNTER — OFFICE VISIT (OUTPATIENT)
Dept: MEDICAL GROUP | Facility: CLINIC | Age: 87
End: 2022-08-05
Payer: MEDICARE

## 2022-08-05 VITALS
HEART RATE: 75 BPM | RESPIRATION RATE: 17 BRPM | OXYGEN SATURATION: 96 % | SYSTOLIC BLOOD PRESSURE: 131 MMHG | WEIGHT: 222 LBS | BODY MASS INDEX: 30.07 KG/M2 | DIASTOLIC BLOOD PRESSURE: 72 MMHG | HEIGHT: 72 IN

## 2022-08-05 DIAGNOSIS — I10 PRIMARY HYPERTENSION: ICD-10-CM

## 2022-08-05 DIAGNOSIS — G57.93 NEUROPATHY OF BOTH FEET: ICD-10-CM

## 2022-08-05 PROBLEM — K21.9 GASTROESOPHAGEAL REFLUX DISEASE: Status: ACTIVE | Noted: 2019-12-04

## 2022-08-05 PROBLEM — N40.0 BENIGN PROSTATIC HYPERPLASIA: Status: ACTIVE | Noted: 2018-12-12

## 2022-08-05 PROBLEM — H91.90 HEARING LOSS: Status: ACTIVE | Noted: 2020-09-11

## 2022-08-05 PROCEDURE — 99214 OFFICE O/P EST MOD 30 MIN: CPT | Mod: GC | Performed by: STUDENT IN AN ORGANIZED HEALTH CARE EDUCATION/TRAINING PROGRAM

## 2022-08-05 ASSESSMENT — FIBROSIS 4 INDEX: FIB4 SCORE: 1.98

## 2022-08-05 NOTE — PROGRESS NOTES
[facility] FAMILY MEDICINE OFFICE VISIT    Date: 8/5/2022    MRN: [id number]  Patient ID: Justo Webber    SUBJECTIVE:  Justo Webber is a 86 y.o. man here for hospital follow-up.  Patient admitted to [facility] in July for disequilibrium.  Justo reports that he was evaluated by ear nose throat since leaving the hospital.  Reports that they are concerned for possibility of BPPV.  States he has been doing the Suha-Hallpike maneuver at home, which has resulted in significant improvement overall in his symptoms.  Patient does note he continues to feel dizzy when closing his eyes, begins to sway.  Patient notes that approximately 1 year ago, he began to have diminished sensation of his bilateral lateral calves.  Attributed this to an outbreak of eczema at the time, though has not had return fully of his sensation since that time.  Patient reports history of a low back/buttocks injury in the 1970s after a hefty physical workout, however denies any known traumatic injury to the area.    PMHx/PSHx:  Patient Active Problem List   Diagnosis   • Near syncope   • Paresthesia of bilateral legs   • Benign prostatic hyperplasia   • Gastroesophageal reflux disease   • Hearing loss   • Hypertension     Allergies: Other food    OBJECTIVE:  Vitals:    08/05/22 1002   BP: 131/72   Pulse:    Resp:    SpO2:      Vitals:    08/05/22 0924 08/05/22 1002   BP: (!) 145/76 131/72   Weight: 101 kg (222 lb)    Height: 1.829 m (6')        Physical Examination:  General: Well appearing man in no acute distress, resting on arrival to room  HEENT: Normocephalic, atraumatic, EOMI  Cardiovascular: Skin pink, no acrocyanosis  Pulmonary: No tachypnea or retractions  Extremities: Moves all spontaneously  Neurological: Alert and oriented, positive Romberg test  Monofilament testing with a 10 gram force: sensation intact: decreased bilaterally  Visual Inspection: Feet without maceration, ulcers, fissures.  Pedal pulses: decreased bilaterally    ASSESSMENT &  PLAN:  Justo Webber is a 86 y.o. man here for hospital follow-up, noted to have elevated blood pressure in the setting of known primary hypertension as well as neuropathy of both feet.    1. Primary hypertension     2. Neuropathy of both feet  US-EXTREMITY ARTERY LOWER BILAT W/GEORGES (COMBO)    Referral to Neurodiagnostics (EEG,EP,EMG/NCS/DBS)       Orders Placed This Encounter   • US-EXTREMITY ARTERY LOWER BILAT W/GEORGES (COMBO)   • Referral to Neurodiagnostics (EEG,EP,EMG/NCS/DBS)       #Primary hypertension  Patient noted to have elevated blood pressure, 145/76 during initial presentation, however improved spontaneously to 131/72 with rest in the room.  Patient continues to take lisinopril 20 mg p.o. daily.  We will continue to check blood pressures at future visits, no need for increase in medication at this time.    #Neuropathy of both feet  Patient noted to have positive Romberg testing, significantly diminished sensation of bilateral plantar feet.  Patient does appear to have diminished pulses in the bilateral lower extremities as well.  Patient with known atherosclerosis of the carotid vessels during recent hospitalization, suspect peripheral arterial disease could potentially be a cause of his diminished sensation, though cannot exclude other causes at this time.  Suspect diminished sensation of his lower extremities is also contributing somewhat to his sense of central disequilibrium.  At this time have ordered ultrasound extremity arterial bilateral with GEORGES, as well as EMG.  Imaging and referrals process discussed with patient.  Advised Justo that physician will contact him with imaging results within 1 week of having any test performed, and to contact the office if he does not hear back on results during that time.  Patient verbalized understanding.  Advised patient to schedule follow-up once test complete for further review.    Justo Hazel M.D.  Family Medicine Resident  PGY-4

## 2022-08-23 ENCOUNTER — HOSPITAL ENCOUNTER (OUTPATIENT)
Dept: RADIOLOGY | Facility: MEDICAL CENTER | Age: 87
End: 2022-08-23
Attending: STUDENT IN AN ORGANIZED HEALTH CARE EDUCATION/TRAINING PROGRAM
Payer: MEDICARE

## 2022-08-23 DIAGNOSIS — G57.93 NEUROPATHY OF BOTH FEET: ICD-10-CM

## 2022-08-23 PROCEDURE — 93922 UPR/L XTREMITY ART 2 LEVELS: CPT

## 2022-08-23 PROCEDURE — 93922 UPR/L XTREMITY ART 2 LEVELS: CPT | Mod: 26 | Performed by: INTERNAL MEDICINE

## 2022-09-08 ENCOUNTER — NON-PROVIDER VISIT (OUTPATIENT)
Dept: NEUROLOGY | Facility: MEDICAL CENTER | Age: 87
End: 2022-09-08
Attending: PSYCHIATRY & NEUROLOGY
Payer: MEDICARE

## 2022-09-08 DIAGNOSIS — G57.93 NEUROPATHY OF BOTH FEET: ICD-10-CM

## 2022-09-08 PROCEDURE — 95886 MUSC TEST DONE W/N TEST COMP: CPT | Performed by: PSYCHIATRY & NEUROLOGY

## 2022-09-08 PROCEDURE — 95886 MUSC TEST DONE W/N TEST COMP: CPT | Mod: 26 | Performed by: PSYCHIATRY & NEUROLOGY

## 2022-09-08 PROCEDURE — 95909 NRV CNDJ TST 5-6 STUDIES: CPT | Mod: 26 | Performed by: PSYCHIATRY & NEUROLOGY

## 2022-09-08 PROCEDURE — 95909 NRV CNDJ TST 5-6 STUDIES: CPT | Performed by: PSYCHIATRY & NEUROLOGY

## 2022-09-08 NOTE — PROCEDURES
"NERVE CONDUCTION STUDIES AND ELECTROMYOGRAPHY REPORT  [facility]  09/08/22          IMPRESSION:  This is an abnormal study.  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  2.  Unobtainable bilateral sural sensory responses which can be seen in advanced age.  An early peripheral neuropathy cannot be ruled out.    Clinical correlation recommended.    Muna Torres MD  Neurology - Neurophysiology              REASON FOR REFERRAL:  Mr. Justo Webber 86 y.o. referred by Dr. Justo Aguero for evaluation of possible peripheral polyneuropathy.  For approximately 6 months, he began having diminished sensation in his lower extremities, starting on the right then progressing to the left.  Symptoms are now worse on the left than right.  He has a history of low back discomfort but there is no worsening back pain.      Height: 6'2\"  Weight: 213 lbs    Symptom focused neurological exam shows reduced sensation in the soles to light touch.  Bilateral Achilles reflexes are unobtainable.  Vibration sensation at the bilateral hallux is approximately 5 seconds.    ELECTRODIAGNOSTIC EXAMINATION:  Nerve conduction studies (NCS) and electromyography (EMG) are utilized to evaluate direct or indirect damage to the peripheral nervous system. NCS are performed to measure the nerve(s) response(s) to electrostimulation across a given nerve segment. EMG evaluates the passive and active electrical activity of the muscle(s) in question.  Muscles are innervated by specific peripheral nerves and roots. Often times, several nerves the muscle to be examined in order to determine the presence or absence of the disease process. Furthermore, nerves and muscles may need to be tested in a [id number] comparison, as well as in additional extremities, as this may be crucial in " characterizing the extent of the disease process, which may be diffuse or isolated and of varying degree of severity. The extent of the neurodiagnostic exam is justified as it may help arrive to a proper diagnosis, which ultimately may contribute to better management of the patient. Therefore, the nerves to muscles examined during the study were medically necessary.    Unless otherwise noted, temperature of the extremity(s) study was monitored before and during the examination and remained between 32 and 36 degrees C for the upper extremities, and between 30 and 36 degrees C for the lower extremities. The patient tolerated testing well, without any complications.       NERVE CONDUCTION STUDY SUMMARY:  Selected nerves of the bilateral lower extremity are studied.    Unobtainable bilateral sural sensory responses.  Normal bilateral common peroneal motor responses.  Normal bilateral tibial motor responses.      NEEDLE EMG SUMMARY:  Concentric needle study of selected bilateral lower extremity muscles is performed.     Insertion activity is normal in all muscles sampled.   Large amplitude prolonged duration motor unit potentials are appreciated in the left extensor hallucis longus.  Otherwise with activation, there are normal morphology (amplitude/duration) motor unit action potentials firing with normal recruitment in remaining muscles tested.       PATIENT DATA TABLES  Nerve Conduction Studies     Stim Site NR Onset (ms) Norm Onset (ms) O-P Amp (µV) Norm O-P Amp Site1 Site2 Delta-P (ms) Dist (cm) Guicho (m/s) Norm Guicho (m/s)   Left Sural Anti Sensory (Lat Mall)   Calf *NR  <4.6  >3 Calf Lat Mall  14.0  >40   Right Sural Anti Sensory (Lat Mall)   Calf *NR  <4.6  >3 Calf Lat Mall  14.0  >40        Stim Site NR Onset (ms) Norm Onset (ms) O-P Amp (mV) Norm O-P Amp Site1 Site2 Delta-0 (ms) Dist (cm) Guicho (m/s) Norm Guicho (m/s)   Left Peroneal EDB Motor (Ext Dig Brev)   Ankle    4.3 <6 3.4 >2.5 B Fib Ankle 8.7 35.0 40 >40   B Fib     13.0  3.2  Poplt B Fib 1.6 10.0 63    Poplt    14.6  2.6          Right Peroneal EDB Motor (Ext Dig Brev)   Ankle    3.9 <6 3.3 >2.5 B Fib Ankle 8.6 37.0 43 >40   B Fib    12.5  3.0  Poplt B Fib 1.8 10.0 56    Poplt    14.3  3.1          Left Tibial Motor (Abd Cummings Brev)   Ankle    4.1 <6 4.7 >4 Knee Ankle 9.9 44.0 44 >40   Knee    14.0  3.9          Right Tibial Motor (Abd Cummings Brev)   Ankle    4.4 <6 4.8 >4 Knee Ankle 11.3 45.0 40 >40   Knee    15.7  3.2                                Electromyography     Side Muscle Nerve Root Ins Act Fibs Psw Amp Dur Poly Recrt Int Pat Comment   Right AntTibialis Dp Br Fibular L4-5 Nml Nml Nml Nml Nml 0 Nml Nml    Right Gastroc Tibial S1-2 Nml Nml Nml Nml Nml 0 Nml Nml    Right VastusLat Femoral L2-4 Nml Nml Nml Nml Nml 0 Nml Nml    Right GluteusMed SupGluteal L5-S1 Nml Nml Nml Nml Nml 0 Nml Nml    Left AntTibialis Dp Br Fibular L4-5 Nml Nml Nml Nml Nml 0 Nml Nml    Left Gastroc Tibial S1-2 Nml Nml Nml Nml Nml 0 Nml Nml    Left VastusLat Femoral L2-4 Nml Nml Nml Nml Nml 0 Nml Nml    Left GluteusMed SupGluteal L5-S1 Nml Nml Nml Nml Nml 0 Nml Nml    Left ExtHallLong Dp Br Fibular L5, S1 Nml Nml Nml *Incr *>12ms 0 Nml Nml    Right ExtHallLong Dp Br Fibular L5, S1 Nml Nml Nml Nml Nml 0 Nml Nml

## 2022-09-13 ENCOUNTER — TELEPHONE (OUTPATIENT)
Dept: MEDICAL GROUP | Facility: CLINIC | Age: 87
End: 2022-09-13

## 2022-09-13 ENCOUNTER — OFFICE VISIT (OUTPATIENT)
Dept: MEDICAL GROUP | Facility: CLINIC | Age: 87
End: 2022-09-13
Payer: MEDICARE

## 2022-09-13 VITALS
HEIGHT: 72 IN | WEIGHT: 220 LBS | RESPIRATION RATE: 17 BRPM | BODY MASS INDEX: 29.8 KG/M2 | SYSTOLIC BLOOD PRESSURE: 127 MMHG | DIASTOLIC BLOOD PRESSURE: 81 MMHG

## 2022-09-13 DIAGNOSIS — R20.2 PARESTHESIA OF BILATERAL LEGS: ICD-10-CM

## 2022-09-13 DIAGNOSIS — Z02.4 ENCOUNTER FOR EXAMINATION FOR DRIVING LICENSE: ICD-10-CM

## 2022-09-13 PROBLEM — R55 NEAR SYNCOPE: Status: RESOLVED | Noted: 2022-07-08 | Resolved: 2022-09-13

## 2022-09-13 PROCEDURE — 99213 OFFICE O/P EST LOW 20 MIN: CPT | Mod: GE | Performed by: STUDENT IN AN ORGANIZED HEALTH CARE EDUCATION/TRAINING PROGRAM

## 2022-09-13 ASSESSMENT — FIBROSIS 4 INDEX: FIB4 SCORE: 1.98

## 2022-09-13 NOTE — PROGRESS NOTES
[facility] FAMILY MEDICINE OFFICE VISIT    Date: 9/13/2022    MRN: [id number]  Patient ID: Justo Webber    SUBJECTIVE:  Justo Webber is a 86 y.o. male here for follow-up.  Justo reports that since past visit, has been working actively to try and build his proprioception.  States that he feels minimally dizzy, however does occasionally feel so at times.  Is still able to ride a bicycle, though is more conscientious about how he is on and off of the bicycle.    Patient reports that he is due for renewal of his 's license at this time.  Brings in form requesting physician authorization to receive 's license.  Also needs vision testing at this time for 's license.    PMHx/PSHx:  Patient Active Problem List   Diagnosis    Paresthesia of bilateral legs    Benign prostatic hyperplasia    Gastroesophageal reflux disease    Hearing loss    Hypertension       Allergies: Other food    OBJECTIVE:  Vitals:    09/13/22 0822   BP: 127/81   Pulse: (P) 69   Resp: 17   SpO2: (P) 92%     Vitals:    09/13/22 0822   BP: 127/81   Weight: 99.8 kg (220 lb)   Height: 1.829 m (6')       Physical Examination:  General: Well appearing male in no acute distress, resting on arrival to room  HEENT: Normocephalic, atraumatic, EOMI  Cardiovascular: RRR, no murmurs, gallops, or rubs  Pulmonary: CTAB, symmetrical chest expansion, no rales, rhonchi, or wheezes  Extremities: Moves all spontaneously  Neurological: Alert and oriented    ASSESSMENT & PLAN:  Justo Webber is a 86 y.o. male following up for paresthesias of bilateral legs, with need for exam for driving license.    1. Paresthesia of bilateral legs        2. Encounter for examination for driving license            No orders of the defined types were placed in this encounter.      #Paresthesias bilateral legs  Reviewed results of GEORGES testing and recent EMG testing with Justo.  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  Also discussed possible L5 past radiculopathy with patient.  Discussed possible use of gabapentin to see whether this makes any improvement in extent of paresthesias, however patient declines taking additional medication at this time.  Discussed importance of continuing to practice activities that enhance proprioception.  Patient may otherwise follow-up as needed for this issue.    #Encounter for examination for driving license  Reviewed patient's medication list and active problem list.  Performed physical examination which was unremarkable.  Patient does note that he has follow-up with a cardiologist in November, however this is for initial consultation prior to an anticipated procedure for cochlear implants.  Does not have any significant cardiac history and continues to ride a bicycle without difficulty.  Performed ocular exam which revealed uncorrected vision of 20/25, with correction of 20/20 vision with glasses use.  Physician filled out required paperwork for patient to file with the [employer].    Justo Hazel M.D.  Family Medicine Resident  PGY-4

## 2022-09-13 NOTE — TELEPHONE ENCOUNTER
Called patient to answer questions about supplemental insurance ([employer]). Let him know that [employer] would not release info to me, but they told me he has to have two of the six daily tasks that interfere with life for insurance to consider long term coverage. Justo understood this, will make F/U with Dr. Hazel to fill out paperwork to submit to [employer].

## 2022-11-10 ENCOUNTER — PATIENT MESSAGE (OUTPATIENT)
Dept: HEALTH INFORMATION MANAGEMENT | Facility: OTHER | Age: 87
End: 2022-11-10

## 2022-12-17 ENCOUNTER — PATIENT MESSAGE (OUTPATIENT)
Dept: MEDICAL GROUP | Facility: CLINIC | Age: 87
End: 2022-12-17
Payer: MEDICARE

## 2023-11-29 ENCOUNTER — PATIENT MESSAGE (OUTPATIENT)
Dept: HEALTH INFORMATION MANAGEMENT | Facility: OTHER | Age: 88
End: 2023-11-29